# Patient Record
Sex: FEMALE | Race: ASIAN | NOT HISPANIC OR LATINO | URBAN - METROPOLITAN AREA
[De-identification: names, ages, dates, MRNs, and addresses within clinical notes are randomized per-mention and may not be internally consistent; named-entity substitution may affect disease eponyms.]

---

## 2024-09-12 ENCOUNTER — EMERGENCY (EMERGENCY)
Facility: HOSPITAL | Age: 24
LOS: 1 days | Discharge: HOME | End: 2024-09-12
Attending: EMERGENCY MEDICINE | Admitting: EMERGENCY MEDICINE
Payer: MEDICAID

## 2024-09-12 VITALS
RESPIRATION RATE: 16 BRPM | SYSTOLIC BLOOD PRESSURE: 113 MMHG | OXYGEN SATURATION: 99 % | HEART RATE: 89 BPM | HEIGHT: 66 IN | WEIGHT: 149.91 LBS | DIASTOLIC BLOOD PRESSURE: 80 MMHG | TEMPERATURE: 98 F

## 2024-09-12 VITALS
RESPIRATION RATE: 16 BRPM | DIASTOLIC BLOOD PRESSURE: 76 MMHG | HEART RATE: 69 BPM | TEMPERATURE: 98 F | OXYGEN SATURATION: 100 % | SYSTOLIC BLOOD PRESSURE: 108 MMHG

## 2024-09-12 LAB
ADD ON TEST-SPECIMEN IN LAB: SIGNIFICANT CHANGE UP
ALBUMIN SERPL ELPH-MCNC: 4.7 G/DL — SIGNIFICANT CHANGE UP (ref 3.3–5)
ALP SERPL-CCNC: 76 U/L — SIGNIFICANT CHANGE UP (ref 40–120)
ALT FLD-CCNC: 21 U/L — SIGNIFICANT CHANGE UP (ref 4–33)
ANION GAP SERPL CALC-SCNC: 14 MMOL/L — SIGNIFICANT CHANGE UP (ref 7–14)
APPEARANCE UR: CLEAR — SIGNIFICANT CHANGE UP
AST SERPL-CCNC: 18 U/L — SIGNIFICANT CHANGE UP (ref 4–32)
BACTERIA # UR AUTO: ABNORMAL /HPF
BASOPHILS # BLD AUTO: 0.06 K/UL — SIGNIFICANT CHANGE UP (ref 0–0.2)
BASOPHILS NFR BLD AUTO: 0.7 % — SIGNIFICANT CHANGE UP (ref 0–2)
BILIRUB SERPL-MCNC: 0.3 MG/DL — SIGNIFICANT CHANGE UP (ref 0.2–1.2)
BILIRUB UR-MCNC: NEGATIVE — SIGNIFICANT CHANGE UP
BUN SERPL-MCNC: 9 MG/DL — SIGNIFICANT CHANGE UP (ref 7–23)
CALCIUM SERPL-MCNC: 10 MG/DL — SIGNIFICANT CHANGE UP (ref 8.4–10.5)
CAST: 2 /LPF — SIGNIFICANT CHANGE UP (ref 0–4)
CHLORIDE SERPL-SCNC: 102 MMOL/L — SIGNIFICANT CHANGE UP (ref 98–107)
CO2 SERPL-SCNC: 22 MMOL/L — SIGNIFICANT CHANGE UP (ref 22–31)
COLOR SPEC: YELLOW — SIGNIFICANT CHANGE UP
CREAT SERPL-MCNC: 0.54 MG/DL — SIGNIFICANT CHANGE UP (ref 0.5–1.3)
DIFF PNL FLD: NEGATIVE — SIGNIFICANT CHANGE UP
EGFR: 133 ML/MIN/1.73M2 — SIGNIFICANT CHANGE UP
EGFR: 133 ML/MIN/1.73M2 — SIGNIFICANT CHANGE UP
EOSINOPHIL # BLD AUTO: 0.1 K/UL — SIGNIFICANT CHANGE UP (ref 0–0.5)
EOSINOPHIL NFR BLD AUTO: 1.2 % — SIGNIFICANT CHANGE UP (ref 0–6)
GLUCOSE SERPL-MCNC: 74 MG/DL — SIGNIFICANT CHANGE UP (ref 70–99)
GLUCOSE UR QL: NEGATIVE MG/DL — SIGNIFICANT CHANGE UP
HCT VFR BLD CALC: 44.1 % — SIGNIFICANT CHANGE UP (ref 34.5–45)
HGB BLD-MCNC: 14.1 G/DL — SIGNIFICANT CHANGE UP (ref 11.5–15.5)
HIV 1+2 AB+HIV1 P24 AG SERPL QL IA: SIGNIFICANT CHANGE UP
IANC: 4.12 K/UL — SIGNIFICANT CHANGE UP (ref 1.8–7.4)
IMM GRANULOCYTES NFR BLD AUTO: 0.2 % — SIGNIFICANT CHANGE UP (ref 0–0.9)
KETONES UR-MCNC: NEGATIVE MG/DL — SIGNIFICANT CHANGE UP
LEUKOCYTE ESTERASE UR-ACNC: ABNORMAL
LIDOCAIN IGE QN: 44 U/L — SIGNIFICANT CHANGE UP (ref 7–60)
LYMPHOCYTES # BLD AUTO: 3.29 K/UL — SIGNIFICANT CHANGE UP (ref 1–3.3)
LYMPHOCYTES # BLD AUTO: 40.6 % — SIGNIFICANT CHANGE UP (ref 13–44)
MCHC RBC-ENTMCNC: 27.4 PG — SIGNIFICANT CHANGE UP (ref 27–34)
MCHC RBC-ENTMCNC: 32 GM/DL — SIGNIFICANT CHANGE UP (ref 32–36)
MCV RBC AUTO: 85.6 FL — SIGNIFICANT CHANGE UP (ref 80–100)
MONOCYTES # BLD AUTO: 0.51 K/UL — SIGNIFICANT CHANGE UP (ref 0–0.9)
MONOCYTES NFR BLD AUTO: 6.3 % — SIGNIFICANT CHANGE UP (ref 2–14)
NEUTROPHILS # BLD AUTO: 4.12 K/UL — SIGNIFICANT CHANGE UP (ref 1.8–7.4)
NEUTROPHILS NFR BLD AUTO: 51 % — SIGNIFICANT CHANGE UP (ref 43–77)
NITRITE UR-MCNC: NEGATIVE — SIGNIFICANT CHANGE UP
NRBC # BLD AUTO: 0 K/UL — SIGNIFICANT CHANGE UP (ref 0–0)
NRBC # BLD: 0 /100 WBCS — SIGNIFICANT CHANGE UP (ref 0–0)
NRBC # FLD: 0 K/UL — SIGNIFICANT CHANGE UP (ref 0–0)
NRBC BLD-RTO: 0 /100 WBCS — SIGNIFICANT CHANGE UP (ref 0–0)
PH UR: 7.5 — SIGNIFICANT CHANGE UP (ref 5–8)
PLATELET # BLD AUTO: 249 K/UL — SIGNIFICANT CHANGE UP (ref 150–400)
POTASSIUM SERPL-MCNC: 4.4 MMOL/L — SIGNIFICANT CHANGE UP (ref 3.5–5.3)
POTASSIUM SERPL-SCNC: 4.4 MMOL/L — SIGNIFICANT CHANGE UP (ref 3.5–5.3)
PROT SERPL-MCNC: 8.5 G/DL — HIGH (ref 6–8.3)
PROT UR-MCNC: NEGATIVE MG/DL — SIGNIFICANT CHANGE UP
RBC # BLD: 5.15 M/UL — SIGNIFICANT CHANGE UP (ref 3.8–5.2)
RBC # FLD: 13 % — SIGNIFICANT CHANGE UP (ref 10.3–14.5)
RBC CASTS # UR COMP ASSIST: 0 /HPF — SIGNIFICANT CHANGE UP (ref 0–4)
REVIEW: SIGNIFICANT CHANGE UP
SODIUM SERPL-SCNC: 138 MMOL/L — SIGNIFICANT CHANGE UP (ref 135–145)
SP GR SPEC: 1.01 — SIGNIFICANT CHANGE UP (ref 1–1.03)
SQUAMOUS # UR AUTO: 9 /HPF — HIGH (ref 0–5)
UROBILINOGEN FLD QL: 0.2 MG/DL — SIGNIFICANT CHANGE UP (ref 0.2–1)
WBC # BLD: 8.1 K/UL — SIGNIFICANT CHANGE UP (ref 3.8–10.5)
WBC # FLD AUTO: 8.1 K/UL — SIGNIFICANT CHANGE UP (ref 3.8–10.5)
WBC UR QL: 1 /HPF — SIGNIFICANT CHANGE UP (ref 0–5)

## 2024-09-12 PROCEDURE — 76705 ECHO EXAM OF ABDOMEN: CPT | Mod: 26

## 2024-09-12 PROCEDURE — 74177 CT ABD & PELVIS W/CONTRAST: CPT | Mod: 26,MC

## 2024-09-12 PROCEDURE — 99285 EMERGENCY DEPT VISIT HI MDM: CPT

## 2024-09-12 PROCEDURE — 76857 US EXAM PELVIC LIMITED: CPT | Mod: 26

## 2024-09-12 PROCEDURE — 76830 TRANSVAGINAL US NON-OB: CPT | Mod: 26

## 2024-09-12 RX ORDER — ACETAMINOPHEN 500 MG/5ML
1000 LIQUID (ML) ORAL ONCE
Refills: 0 | Status: COMPLETED | OUTPATIENT
Start: 2024-09-12 | End: 2024-09-12

## 2024-09-12 RX ADMIN — Medication 1000 MILLILITER(S): at 12:33

## 2024-09-12 RX ADMIN — Medication 400 MILLIGRAM(S): at 12:32

## 2024-09-13 PROBLEM — Z78.9 OTHER SPECIFIED HEALTH STATUS: Chronic | Status: ACTIVE | Noted: 2024-09-12

## 2024-09-13 PROBLEM — Z00.00 ENCOUNTER FOR PREVENTIVE HEALTH EXAMINATION: Status: ACTIVE | Noted: 2024-09-13

## 2024-09-13 LAB
C TRACH RRNA SPEC QL NAA+PROBE: SIGNIFICANT CHANGE UP
N GONORRHOEA RRNA SPEC QL NAA+PROBE: SIGNIFICANT CHANGE UP
SPECIMEN SOURCE: SIGNIFICANT CHANGE UP
T PALLIDUM AB TITR SER: NEGATIVE — SIGNIFICANT CHANGE UP

## 2024-09-15 ENCOUNTER — TRANSCRIPTION ENCOUNTER (OUTPATIENT)
Age: 24
End: 2024-09-15

## 2024-09-16 ENCOUNTER — INPATIENT (INPATIENT)
Facility: HOSPITAL | Age: 24
LOS: 0 days | Discharge: HOME | End: 2024-09-17
Attending: OBSTETRICS & GYNECOLOGY | Admitting: OBSTETRICS & GYNECOLOGY
Payer: MEDICAID

## 2024-09-16 ENCOUNTER — TRANSCRIPTION ENCOUNTER (OUTPATIENT)
Age: 24
End: 2024-09-16

## 2024-09-16 VITALS
DIASTOLIC BLOOD PRESSURE: 87 MMHG | HEART RATE: 77 BPM | WEIGHT: 149.91 LBS | OXYGEN SATURATION: 99 % | TEMPERATURE: 98 F | HEIGHT: 66 IN | RESPIRATION RATE: 16 BRPM | SYSTOLIC BLOOD PRESSURE: 122 MMHG

## 2024-09-16 DIAGNOSIS — R10.9 UNSPECIFIED ABDOMINAL PAIN: ICD-10-CM

## 2024-09-16 LAB
-  AMOXICILLIN/CLAVULANIC ACID: SIGNIFICANT CHANGE UP
-  AMPICILLIN/SULBACTAM: SIGNIFICANT CHANGE UP
-  AMPICILLIN: SIGNIFICANT CHANGE UP
-  AZTREONAM: SIGNIFICANT CHANGE UP
-  CEFAZOLIN: SIGNIFICANT CHANGE UP
-  CEFEPIME: SIGNIFICANT CHANGE UP
-  CEFOXITIN: SIGNIFICANT CHANGE UP
-  CEFTRIAXONE: SIGNIFICANT CHANGE UP
-  CEFUROXIME: SIGNIFICANT CHANGE UP
-  CIPROFLOXACIN: SIGNIFICANT CHANGE UP
-  ERTAPENEM: SIGNIFICANT CHANGE UP
-  GENTAMICIN: SIGNIFICANT CHANGE UP
-  IMIPENEM: SIGNIFICANT CHANGE UP
-  LEVOFLOXACIN: SIGNIFICANT CHANGE UP
-  MEROPENEM: SIGNIFICANT CHANGE UP
-  NITROFURANTOIN: SIGNIFICANT CHANGE UP
-  PIPERACILLIN/TAZOBACTAM: SIGNIFICANT CHANGE UP
-  TOBRAMYCIN: SIGNIFICANT CHANGE UP
-  TRIMETHOPRIM/SULFAMETHOXAZOLE: SIGNIFICANT CHANGE UP
ALBUMIN SERPL ELPH-MCNC: 4.1 G/DL — SIGNIFICANT CHANGE UP (ref 3.3–5)
ALP SERPL-CCNC: 59 U/L — SIGNIFICANT CHANGE UP (ref 40–120)
ALT FLD-CCNC: 20 U/L — SIGNIFICANT CHANGE UP (ref 4–33)
ANION GAP SERPL CALC-SCNC: 10 MMOL/L — SIGNIFICANT CHANGE UP (ref 7–14)
APPEARANCE UR: ABNORMAL
AST SERPL-CCNC: 16 U/L — SIGNIFICANT CHANGE UP (ref 4–32)
BACTERIA # UR AUTO: ABNORMAL /HPF
BASOPHILS # BLD AUTO: 0.05 K/UL — SIGNIFICANT CHANGE UP (ref 0–0.2)
BASOPHILS NFR BLD AUTO: 0.5 % — SIGNIFICANT CHANGE UP (ref 0–2)
BILIRUB SERPL-MCNC: 0.3 MG/DL — SIGNIFICANT CHANGE UP (ref 0.2–1.2)
BILIRUB UR-MCNC: NEGATIVE — SIGNIFICANT CHANGE UP
BLD GP AB SCN SERPL QL: NEGATIVE — SIGNIFICANT CHANGE UP
BUN SERPL-MCNC: 14 MG/DL — SIGNIFICANT CHANGE UP (ref 7–23)
CALCIUM SERPL-MCNC: 9.4 MG/DL — SIGNIFICANT CHANGE UP (ref 8.4–10.5)
CAST: 1 /LPF — SIGNIFICANT CHANGE UP (ref 0–4)
CHLORIDE SERPL-SCNC: 102 MMOL/L — SIGNIFICANT CHANGE UP (ref 98–107)
CO2 SERPL-SCNC: 23 MMOL/L — SIGNIFICANT CHANGE UP (ref 22–31)
COLOR SPEC: YELLOW — SIGNIFICANT CHANGE UP
CREAT SERPL-MCNC: 0.55 MG/DL — SIGNIFICANT CHANGE UP (ref 0.5–1.3)
CULTURE RESULTS: ABNORMAL
DIFF PNL FLD: NEGATIVE — SIGNIFICANT CHANGE UP
EGFR: 132 ML/MIN/1.73M2 — SIGNIFICANT CHANGE UP
EOSINOPHIL # BLD AUTO: 0.05 K/UL — SIGNIFICANT CHANGE UP (ref 0–0.5)
EOSINOPHIL NFR BLD AUTO: 0.5 % — SIGNIFICANT CHANGE UP (ref 0–6)
GLUCOSE SERPL-MCNC: 113 MG/DL — HIGH (ref 70–99)
GLUCOSE UR QL: NEGATIVE MG/DL — SIGNIFICANT CHANGE UP
HCT VFR BLD CALC: 41.8 % — SIGNIFICANT CHANGE UP (ref 34.5–45)
HGB BLD-MCNC: 13.4 G/DL — SIGNIFICANT CHANGE UP (ref 11.5–15.5)
IANC: 6.66 K/UL — SIGNIFICANT CHANGE UP (ref 1.8–7.4)
IMM GRANULOCYTES NFR BLD AUTO: 0.2 % — SIGNIFICANT CHANGE UP (ref 0–0.9)
KETONES UR-MCNC: ABNORMAL MG/DL
LEUKOCYTE ESTERASE UR-ACNC: ABNORMAL
LYMPHOCYTES # BLD AUTO: 2.58 K/UL — SIGNIFICANT CHANGE UP (ref 1–3.3)
LYMPHOCYTES # BLD AUTO: 26.4 % — SIGNIFICANT CHANGE UP (ref 13–44)
MCHC RBC-ENTMCNC: 27.2 PG — SIGNIFICANT CHANGE UP (ref 27–34)
MCHC RBC-ENTMCNC: 32.1 GM/DL — SIGNIFICANT CHANGE UP (ref 32–36)
MCV RBC AUTO: 85 FL — SIGNIFICANT CHANGE UP (ref 80–100)
METHOD TYPE: SIGNIFICANT CHANGE UP
MONOCYTES # BLD AUTO: 0.41 K/UL — SIGNIFICANT CHANGE UP (ref 0–0.9)
MONOCYTES NFR BLD AUTO: 4.2 % — SIGNIFICANT CHANGE UP (ref 2–14)
NEUTROPHILS # BLD AUTO: 6.66 K/UL — SIGNIFICANT CHANGE UP (ref 1.8–7.4)
NEUTROPHILS NFR BLD AUTO: 68.2 % — SIGNIFICANT CHANGE UP (ref 43–77)
NITRITE UR-MCNC: POSITIVE
NRBC # BLD: 0 /100 WBCS — SIGNIFICANT CHANGE UP (ref 0–0)
NRBC # FLD: 0 K/UL — SIGNIFICANT CHANGE UP (ref 0–0)
ORGANISM # SPEC MICROSCOPIC CNT: ABNORMAL
ORGANISM # SPEC MICROSCOPIC CNT: ABNORMAL
PH UR: 5.5 — SIGNIFICANT CHANGE UP (ref 5–8)
PLATELET # BLD AUTO: 222 K/UL — SIGNIFICANT CHANGE UP (ref 150–400)
POTASSIUM SERPL-MCNC: 4.2 MMOL/L — SIGNIFICANT CHANGE UP (ref 3.5–5.3)
POTASSIUM SERPL-SCNC: 4.2 MMOL/L — SIGNIFICANT CHANGE UP (ref 3.5–5.3)
PROT SERPL-MCNC: 7.5 G/DL — SIGNIFICANT CHANGE UP (ref 6–8.3)
PROT UR-MCNC: SIGNIFICANT CHANGE UP MG/DL
RBC # BLD: 4.92 M/UL — SIGNIFICANT CHANGE UP (ref 3.8–5.2)
RBC # FLD: 12.8 % — SIGNIFICANT CHANGE UP (ref 10.3–14.5)
RBC CASTS # UR COMP ASSIST: 2 /HPF — SIGNIFICANT CHANGE UP (ref 0–4)
RH IG SCN BLD-IMP: POSITIVE — SIGNIFICANT CHANGE UP
SODIUM SERPL-SCNC: 135 MMOL/L — SIGNIFICANT CHANGE UP (ref 135–145)
SP GR SPEC: 1.03 — HIGH (ref 1–1.03)
SPECIMEN SOURCE: SIGNIFICANT CHANGE UP
SQUAMOUS # UR AUTO: 20 /HPF — HIGH (ref 0–5)
UROBILINOGEN FLD QL: 1 MG/DL — SIGNIFICANT CHANGE UP (ref 0.2–1)
WBC # BLD: 9.77 K/UL — SIGNIFICANT CHANGE UP (ref 3.8–10.5)
WBC # FLD AUTO: 9.77 K/UL — SIGNIFICANT CHANGE UP (ref 3.8–10.5)
WBC UR QL: 5 /HPF — SIGNIFICANT CHANGE UP (ref 0–5)

## 2024-09-16 PROCEDURE — 88341 IMHCHEM/IMCYTCHM EA ADD ANTB: CPT | Mod: 26

## 2024-09-16 PROCEDURE — 76830 TRANSVAGINAL US NON-OB: CPT | Mod: 26

## 2024-09-16 PROCEDURE — 58925 REMOVAL OF OVARIAN CYST(S): CPT

## 2024-09-16 PROCEDURE — 99285 EMERGENCY DEPT VISIT HI MDM: CPT

## 2024-09-16 PROCEDURE — 88305 TISSUE EXAM BY PATHOLOGIST: CPT | Mod: 26

## 2024-09-16 PROCEDURE — 88342 IMHCHEM/IMCYTCHM 1ST ANTB: CPT | Mod: 26

## 2024-09-16 DEVICE — ARISTA 3GR: Type: IMPLANTABLE DEVICE | Status: FUNCTIONAL

## 2024-09-16 RX ORDER — HYDROMORPHONE HYDROCHLORIDE 2 MG/1
0.5 TABLET ORAL
Refills: 0 | Status: DISCONTINUED | OUTPATIENT
Start: 2024-09-17 | End: 2024-09-17

## 2024-09-16 RX ORDER — METOCLOPRAMIDE HCL 5 MG
10 TABLET ORAL ONCE
Refills: 0 | Status: DISCONTINUED | OUTPATIENT
Start: 2024-09-17 | End: 2024-09-17

## 2024-09-16 RX ORDER — ACETAMINOPHEN 325 MG/1
1000 TABLET ORAL ONCE
Refills: 0 | Status: COMPLETED | OUTPATIENT
Start: 2024-09-16 | End: 2024-09-16

## 2024-09-16 RX ORDER — KETOROLAC TROMETHAMINE 30 MG/ML
15 INJECTION, SOLUTION INTRAMUSCULAR EVERY 6 HOURS
Refills: 0 | Status: DISCONTINUED | OUTPATIENT
Start: 2024-09-16 | End: 2024-09-17

## 2024-09-16 RX ORDER — ACETAMINOPHEN 325 MG/1
1000 TABLET ORAL EVERY 8 HOURS
Refills: 0 | Status: DISCONTINUED | OUTPATIENT
Start: 2024-09-16 | End: 2024-09-17

## 2024-09-16 RX ORDER — ONDANSETRON 2 MG/ML
4 INJECTION, SOLUTION INTRAMUSCULAR; INTRAVENOUS ONCE
Refills: 0 | Status: DISCONTINUED | OUTPATIENT
Start: 2024-09-17 | End: 2024-09-17

## 2024-09-16 RX ORDER — SODIUM CHLORIDE 9 MG/ML
1000 INJECTION INTRAMUSCULAR; INTRAVENOUS; SUBCUTANEOUS ONCE
Refills: 0 | Status: COMPLETED | OUTPATIENT
Start: 2024-09-16 | End: 2024-09-16

## 2024-09-16 RX ORDER — KETOROLAC TROMETHAMINE 30 MG/ML
15 INJECTION, SOLUTION INTRAMUSCULAR ONCE
Refills: 0 | Status: DISCONTINUED | OUTPATIENT
Start: 2024-09-16 | End: 2024-09-16

## 2024-09-16 RX ADMIN — KETOROLAC TROMETHAMINE 15 MILLIGRAM(S): 30 INJECTION, SOLUTION INTRAMUSCULAR at 10:39

## 2024-09-16 RX ADMIN — ACETAMINOPHEN 400 MILLIGRAM(S): 325 TABLET ORAL at 14:24

## 2024-09-16 RX ADMIN — Medication 125 MILLILITER(S): at 20:13

## 2024-09-16 RX ADMIN — Medication 100 MILLIGRAM(S): at 10:43

## 2024-09-16 RX ADMIN — ACETAMINOPHEN 1000 MILLIGRAM(S): 325 TABLET ORAL at 15:10

## 2024-09-16 RX ADMIN — KETOROLAC TROMETHAMINE 15 MILLIGRAM(S): 30 INJECTION, SOLUTION INTRAMUSCULAR at 14:17

## 2024-09-16 RX ADMIN — SODIUM CHLORIDE 1000 MILLILITER(S): 9 INJECTION INTRAMUSCULAR; INTRAVENOUS; SUBCUTANEOUS at 10:34

## 2024-09-16 NOTE — ED ADULT TRIAGE NOTE - CHIEF COMPLAINT QUOTE
Pt presents for abdominal pain to Rt side of abdomen since this past Thursday accompanied with nausea/vomiting. Pt was treated and evaluated last Thursday for same symptoms, instructed to follow up with gastroenterologist, pt has appointment tomorrow but pain is constant.

## 2024-09-16 NOTE — ED PROVIDER NOTE - CLINICAL SUMMARY MEDICAL DECISION MAKING FREE TEXT BOX
Otherwise healthy female presents with continued right-sided abdominal pain and dysuria.  Patient was seen in this ED on September 12, had CT scan of the abdomen pelvis as well as transvaginal ultrasound showing 2 large structures concerning for endometriomas near the right ovary.  She also had urine culture growing E. coli and continues to have dysuria, was not previously put on antibiotics.  Denies back pain, fevers.  Has only been thing Tylenol for pain but has not had much relief.  She has a scheduled appointment tomorrow for gynecology for further evaluation of likely endometriomas. Plan for pain control, antibiotics, repeat transvaginal ultrasound without torsion, Though low suspicion given no significant increase in pain, has been consistent since prior ED evaluation and no significant tenderness on my exam.

## 2024-09-16 NOTE — ED ADULT NURSE REASSESSMENT NOTE - NS ED NURSE REASSESS COMMENT FT1
A&Ox4, respirations are even and unlabored, no complaints at this time. Report given to OR RN (Pete) by day shift. Pt belongings with mother.

## 2024-09-16 NOTE — ED PROVIDER NOTE - PHYSICAL EXAMINATION
GEN - NAD; well appearing; A+O x3   HEAD - NC/AT   ENT: Airway patent, mmm  NECK: Neck supple  PULMONARY -no resp distress  ABDOMEN - +BS, ND, TTP R side of abd, soft, no guarding, no rebound, no masses   BACK - no CVA tenderness  EXTREMITIES - FROM  NEUROLOGIC - alert, speech clear  PSYCH -nl mood/affect, nl insight.

## 2024-09-16 NOTE — H&P ADULT - ASSESSMENT
A/P:    **incomplete**      L Panella PGY2 A/P: Patient is a 23yo G0 LMP 8/26 with known hx ovarian cysts presenting to the ED with RLQ pain. TVUS with 9cm and 5cm cysts, likely hemorrhagic vs endometrioma. Blood flow demonstrated on ultrasound. Differential for pain includes pain from large space occupying ovarian cyst vs. possible ovarian cyst leakage (possible given small amount of complex free fluid seen on TVUS) vs. intermittent ovarian torsion. Patient currently with overall benign abdomen. However given history of previously severe pain along with nausea and vomiting and large size of cyst on imaging it is difficult to rule out intermittant torsion in this clinical picture. Will admit to GYN for laparoscopic cystectomy in setting of suspected intermittent ovarian torsion. Patient expressed understanding. All questions/concerns addressed. Patient last ate at 1PM, so will plan for surgery after patient NPO@9PM.     Neuro: IV Tylenol and Toradol for pain control  CV: Hemodynamically stable  Pulm: Saturating well on RA  GI: NPO  - will meet NPO status at 9PM  : Voiding spontaneously  Heme: Continue ambulation for DVT ppx. Increase OOB.    ID: UTI  - Pt has known E. Coli UTI found on Cx from 9/12  - s/p CTX x1 in ED  - Will send patient home with PO abx  Endo: No acute issues  Dispo: continue inpatient care until able to go back to operating room. Anticipate d/c from PACU postoperatively    SATHYA Schmidt PGY2  patient seen and examined with Dr. Garcia

## 2024-09-16 NOTE — H&P ADULT - ATTENDING COMMENTS
enlarging  hemorhagic ovarian cyst with recurrent pain  Pt ate rice at 1pm. Currently w/o acute abdomen. Pt to have diagnostic laparoscopy,ov cystectomy with removal/repair diseased /dammaged tissue,poss laparotomy. Surgeon will rev R/B/A,side effects ,compls proposed procedure.Pelvic EUA will be done with learners as well  Pt mom present as well  Ample time for questions provided

## 2024-09-16 NOTE — ED PROVIDER NOTE - PROGRESS NOTE DETAILS
Patient reassessed and still reporting pain.  Offered stronger analgesia such as morphine and patient declined.

## 2024-09-16 NOTE — H&P ADULT - NSHPPHYSICALEXAM_GEN_ALL_CORE
Physical Exam (Chaperoned by ED RN):     General: sitting comfortably in bed, NAD   Lungs: unlabored breathing  Back: Mild R sided CVA tenderness  Abd: Soft, non-tender, non-distended. Moderate tenderness to palpation over the bladder and in the RLQ, no rebound or guarding.   : No bleeding on pad. External labia wnl. Bimanual exam with no cervical motion tenderness, uterus wnl, mild right adnexal tenderness and fullness. Cervix closed  Ext: non-tender b/l, no edema       T(C): 36.4 (09-16-24 @ 09:00), Max: 36.4 (09-16-24 @ 09:00)  HR: 77 (09-16-24 @ 09:00) (77 - 77)  BP: 122/87 (09-16-24 @ 09:00) (122/87 - 122/87)  RR: 16 (09-16-24 @ 09:00) (16 - 16)  SpO2: 99% (09-16-24 @ 09:00) (99% - 99%)

## 2024-09-16 NOTE — ED ADULT NURSE NOTE - OBJECTIVE STATEMENT
This is a 24 y/0 female alert and oriented x 4, with a recent diagnosis of cyst between her ovary and uterus area. Complaining of constant pain 6/10. Abdomen is tender on palpation, denies urinary symptoms, chest is clear bilaterally, breathing is even and unlabored. Denies vaginal bleeding  or any discharges. IV initiated on right hand g 20 blood work sent to lab. Waiting for results, due meds given.

## 2024-09-16 NOTE — H&P ADULT - HISTORY OF PRESENT ILLNESS
Gyn H&P  TOSHA CHACON  24y  Female [id number]    HPI:      Name of GYN Physician:     OBHx:    GYNHx: Denies fibroids, cysts, endometriosis, STI's, abnormal pap smears   PMH:  PSH:  Meds:  All:  Soc:    accepts blood      Physical Exam (Chaperoned by ED RN):     General: sitting comfortably in bed, NAD   CV: RRR  Lungs: CTAB  Back: No CVA tenderness  Abd: Soft, non-tender, non-distended. Bowel sounds present.    :  No bleeding on pad. External labia wnl. Bimanual exam with no cervical motion tenderness, uterus wnl, adnexa non palpable b/l. Cervix closed vs. Cervix dilated cm   Speculum Exam: No active bleeding from os.  Physiologic discharge.    Ext: non-tender b/l, no edema       T(C): 36.4 (09-16-24 @ 09:00), Max: 36.4 (09-16-24 @ 09:00)  HR: 77 (09-16-24 @ 09:00) (77 - 77)  BP: 122/87 (09-16-24 @ 09:00) (122/87 - 122/87)  RR: 16 (09-16-24 @ 09:00) (16 - 16)  SpO2: 99% (09-16-24 @ 09:00) (99% - 99%)      LABS:                              13.4   9.77  )-----------( 222      ( 16 Sep 2024 11:16 )             41.8     09-16    135  |  102  |  14  ----------------------------<  113<H>  4.2   |  23  |  0.55    Ca    9.4      16 Sep 2024 11:16    TPro  7.5  /  Alb  4.1  /  TBili  0.3  /  DBili  x   /  AST  16  /  ALT  20  /  AlkPhos  59  09-16    I&O's Detail      Urinalysis Basic - ( 16 Sep 2024 11:16 )    Color: x / Appearance: x / SG: x / pH: x  Gluc: 113 mg/dL / Ketone: x  / Bili: x / Urobili: x   Blood: x / Protein: x / Nitrite: x   Leuk Esterase: x / RBC: x / WBC x   Sq Epi: x / Non Sq Epi: x / Bacteria: x          RADIOLOGY & ADDITIONAL STUDIES:    A/P:    L Panella PGY2 Gyn H&P  TOSHA INES  24y  Female [id number]    HPI: Patient is a 23yo G0 LMP 8/26 with known hx ovarian cysts presenting to the ED with RLQ pain.    The patient was originally seen in the ED on 9/12, at which time she was having RLQ pain and dysuria. Urine culture from that visit came back 50-99k E. Coli, however she was not treated with antibiotics. The patient reports that since that time, her pain has become progressively worse. The pain is mostly in her RLQ and radiates into her lower back. It is cramping, but now more sharp and at its worst is a 10/10. After Toradol in the ED she reports it's an 8/10. The pain was bad enough that she vomited this morning. She said she feels like she has a mild fever and still has dysuria and pain over her bladder. She otherwise denies any CP, SOB, upper abdominal pain, vaginal bleeding or discharge, constipation, diarrhea. She had plans to see a new GYN in the office tomorrow.      Name of GYN Physician: none    OBHx:    GYNHx: denies hx STI's, abnormal pap smears. +small fibroids, + ovarian cysts, +possible endometriosis (cysts appear like endometriomas). Has regular monthly menses, painful  PMH: denies  PSH: denies  Meds: Tylenol PRN, Motrin PRN  All: NKDA  Soc: denies T/A/D    accepts blood

## 2024-09-16 NOTE — H&P ADULT - NSHPLABSRESULTS_GEN_ALL_CORE
LABS:                        13.4   9.77  )-----------( 222      ( 16 Sep 2024 11:16 )             41.8     09-16    135  |  102  |  14  ----------------------------<  113<H>  4.2   |  23  |  0.55    Ca    9.4      16 Sep 2024 11:16    TPro  7.5  /  Alb  4.1  /  TBili  0.3  /  DBili  x   /  AST  16  /  ALT  20  /  AlkPhos  59  09-16    I&O's Detail      Urinalysis Basic - ( 16 Sep 2024 11:16 )    Color: x / Appearance: x / SG: x / pH: x  Gluc: 113 mg/dL / Ketone: x  / Bili: x / Urobili: x   Blood: x / Protein: x / Nitrite: x   Leuk Esterase: x / RBC: x / WBC x   Sq Epi: x / Non Sq Epi: x / Bacteria: x    Culture - Urine (09.12.24 @ 15:02)   - Amoxicillin/Clavulanic Acid: S <=8/4 Consider reserving for cystitis when ampicillin/sulbactam is resistant  - Ampicillin: R >16 These ampicillin results predict results for amoxicillin  - Ampicillin/Sulbactam: R >16/8  - Aztreonam: S <=4  - Cefazolin: S 4 For uncomplicated UTI with K. pneumoniae, E. coli, or P. mirablis: DEVAN <=16 is sensitive and DEVAN >=32 is resistant. This also predicts results for oral agents cefaclor, cefdinir, cefpodoxime, cefprozil, cefuroxime axetil, cephalexin and locarbef for uncomplicated UTI. Note that some isolates may be susceptible to these agents while testing resistant to cefazolin.  - Cefepime: S <=2  - Cefoxitin: S <=8  - Ceftriaxone: S <=1  - Cefuroxime: S <=4  - Ciprofloxacin: S <=0.25  - Ertapenem: S <=0.5  - Gentamicin: S <=2  - Imipenem: S <=1  - Levofloxacin: S <=0.5  - Meropenem: S <=1  - Nitrofurantoin: S <=32 Should not be used to treat pyelonephritis  - Piperacillin/Tazobactam: S <=8  - Tobramycin: S <=2  - Trimethoprim/Sulfamethoxazole: S <=0.5/9.5  Specimen Source: Clean Catch Clean Catch (Midstream)  Culture Results:   50,000 - 99,000 CFU/mL Escherichia coli  Organism Identification: Escherichia coli  Organism: Escherichia coli  Method Type: DEVAN      RADIOLOGY & ADDITIONAL STUDIES:  < from: US Transvaginal (09.16.24 @ 11:48) >    PROCEDURE DATE:  09/16/2024          INTERPRETATION:  CLINICAL INFORMATION: Pelvic pain. Evaluate for torsion.    LMP: N/A    COMPARISON: None available.    TECHNIQUE:  Endovaginal pelvic sonogram only.    FINDINGS:  Uterus: 7.7 cm x 4.0 cm x 5.3 cm. Within normal limits.  Endometrium: 9 mm. Within normal limits.    Right ovary: Cystic structure in the right adnexa with low-level echoes   demonstrating incomplete septations measures 9.3 x 6.5 x 7.4 cm, possibly   hematosalpinx or endometrioma. Right adnexal cyst measures 3.8 x 2.6 x   5.0 cm. Normal arterial and venous waveforms.  Left ovary: 3.6 cm x 2.5 cm x 2.0 cm. Within normal limits.    Fluid: Small amount of complex free fluid, likely hemorrhagic.    IMPRESSION:  *  Cystic structure in the right adnexa with low level echoes and   incomplete septations measuring up to 9.3 cm, possibly reflecting   endometrioma or hematosalpinx.  *  Right adnexal simple appearing cyst measuring up to 5.0 cm.  *  Small amount of complex free fluid in the pelvis, likely hemorrhagic.  *  No evidence of ovarian torsion, as clinically questioned.

## 2024-09-17 VITALS
RESPIRATION RATE: 16 BRPM | OXYGEN SATURATION: 100 % | HEART RATE: 95 BPM | SYSTOLIC BLOOD PRESSURE: 112 MMHG | DIASTOLIC BLOOD PRESSURE: 79 MMHG

## 2024-09-17 LAB
HCT VFR BLD CALC: 37.8 % — SIGNIFICANT CHANGE UP (ref 34.5–45)
HGB BLD-MCNC: 12.4 G/DL — SIGNIFICANT CHANGE UP (ref 11.5–15.5)
MCHC RBC-ENTMCNC: 27.6 PG — SIGNIFICANT CHANGE UP (ref 27–34)
MCHC RBC-ENTMCNC: 32.8 GM/DL — SIGNIFICANT CHANGE UP (ref 32–36)
MCV RBC AUTO: 84.2 FL — SIGNIFICANT CHANGE UP (ref 80–100)
NRBC # BLD: 0 /100 WBCS — SIGNIFICANT CHANGE UP (ref 0–0)
NRBC # FLD: 0 K/UL — SIGNIFICANT CHANGE UP (ref 0–0)
PLATELET # BLD AUTO: 217 K/UL — SIGNIFICANT CHANGE UP (ref 150–400)
RBC # BLD: 4.49 M/UL — SIGNIFICANT CHANGE UP (ref 3.8–5.2)
RBC # FLD: 12.7 % — SIGNIFICANT CHANGE UP (ref 10.3–14.5)
WBC # BLD: 15.25 K/UL — HIGH (ref 3.8–10.5)
WBC # FLD AUTO: 15.25 K/UL — HIGH (ref 3.8–10.5)

## 2024-09-17 RX ORDER — OXYCODONE HYDROCHLORIDE 5 MG/1
1 TABLET ORAL
Qty: 8 | Refills: 0
Start: 2024-09-17

## 2024-09-17 RX ORDER — CEFDINIR 300 MG/1
1 CAPSULE ORAL
Qty: 14 | Refills: 0
Start: 2024-09-17 | End: 2024-09-23

## 2024-09-17 RX ADMIN — Medication 125 MILLILITER(S): at 04:23

## 2024-09-17 RX ADMIN — HYDROMORPHONE HYDROCHLORIDE 0.5 MILLIGRAM(S): 2 TABLET ORAL at 02:40

## 2024-09-17 RX ADMIN — HYDROMORPHONE HYDROCHLORIDE 0.5 MILLIGRAM(S): 2 TABLET ORAL at 02:25

## 2024-09-17 RX ADMIN — HYDROMORPHONE HYDROCHLORIDE 0.5 MILLIGRAM(S): 2 TABLET ORAL at 04:23

## 2024-09-17 NOTE — ASU DISCHARGE PLAN (ADULT/PEDIATRIC) - DIET/FLUID RESTRICTION
IVY - OAK - SUMAC-ADULT-
Pt wants to be seen tomorrow for poison ivy rash starting to involve the face/cheek area  Appointment scheduled for tomorrow  
Regarding: sick ryan poison ivy  ----- Message from Lisandro Albrecht sent at 6/15/2022  6:05 PM EDT -----  "I have  poison ivy and I want to make an sick appointment with my doctor I don't want to go to an urgent care"
No change/Progress slowly

## 2024-09-17 NOTE — ASU DISCHARGE PLAN (ADULT/PEDIATRIC) - CARE PROVIDER_API CALL
Dar Gillespie  Obstetrics and Gynecology  [address] [facility], [address] 1 [address]  [place] [address]  Phone: [phone]  Fax: [phone]  Phone: (   )    -  Fax: (   )    -  Follow Up Time: 2 weeks

## 2024-09-17 NOTE — BRIEF OPERATIVE NOTE - NSICDXBRIEFPREOP_GEN_ALL_CORE_FT
PRE-OP DIAGNOSIS:  Right ovarian cyst 17-Sep-2024 01:44:27  Carlee Acuña  Pelvic pain 17-Sep-2024 01:44:34  Carlee Acuña

## 2024-09-17 NOTE — ASU DISCHARGE PLAN (ADULT/PEDIATRIC) - NURSING INSTRUCTIONS
You were given intravenous TYLENOL for pain management at ____1045pm___________. Please DO NOT take any products containing TYLENOL or ACETAMINOPHEN, such as VICODIN, PERCOCET, EXCEDRIN, and any over-the-counter cold medication for the next 6 hours (until ________445am______). DO NOT TAKE MORE THAN 3000 MG OF TYLENOL in a 24 hour period.

## 2024-09-17 NOTE — BRIEF OPERATIVE NOTE - OPERATION/FINDINGS
Normal upper abdominal survey. Uterus normal appearing. Left fallopian tube and ovary normal in appearance. Right fallopian tube normal. Right ovary enlarged to approximately 9cm with 6 simple appearing ovarian cysts ranging in size from 2cm to 5cm. Cysts ruptured intraoperatively and straw colored fluid suctioned. Cyst walls removed and miladis powder placed over ovarian surgical bed.

## 2024-09-17 NOTE — ASU DISCHARGE PLAN (ADULT/PEDIATRIC) - PROVIDER TOKENS
FREE:[LAST:[Verna],FIRST:[Dar],PHONE:[(   )    -],FAX:[(   )    -],ADDRESS:[Obstetrics and Gynecology  [address] [place] [address], [address] 1 [address]  [place] [address]  Phone: [phone]  Fax: [phone]],FOLLOWUP:[2 weeks]]

## 2024-09-19 LAB
-  AMOXICILLIN/CLAVULANIC ACID: SIGNIFICANT CHANGE UP
-  AMPICILLIN/SULBACTAM: SIGNIFICANT CHANGE UP
-  AMPICILLIN: SIGNIFICANT CHANGE UP
-  AZTREONAM: SIGNIFICANT CHANGE UP
-  CEFAZOLIN: SIGNIFICANT CHANGE UP
-  CEFEPIME: SIGNIFICANT CHANGE UP
-  CEFOXITIN: SIGNIFICANT CHANGE UP
-  CEFTRIAXONE: SIGNIFICANT CHANGE UP
-  CEFUROXIME: SIGNIFICANT CHANGE UP
-  CIPROFLOXACIN: SIGNIFICANT CHANGE UP
-  ERTAPENEM: SIGNIFICANT CHANGE UP
-  GENTAMICIN: SIGNIFICANT CHANGE UP
-  IMIPENEM: SIGNIFICANT CHANGE UP
-  LEVOFLOXACIN: SIGNIFICANT CHANGE UP
-  MEROPENEM: SIGNIFICANT CHANGE UP
-  NITROFURANTOIN: SIGNIFICANT CHANGE UP
-  PIPERACILLIN/TAZOBACTAM: SIGNIFICANT CHANGE UP
-  TOBRAMYCIN: SIGNIFICANT CHANGE UP
-  TRIMETHOPRIM/SULFAMETHOXAZOLE: SIGNIFICANT CHANGE UP
CULTURE RESULTS: ABNORMAL
METHOD TYPE: SIGNIFICANT CHANGE UP
ORGANISM # SPEC MICROSCOPIC CNT: ABNORMAL
ORGANISM # SPEC MICROSCOPIC CNT: ABNORMAL
SPECIMEN SOURCE: SIGNIFICANT CHANGE UP

## 2024-10-01 ENCOUNTER — NON-APPOINTMENT (OUTPATIENT)
Age: 24
End: 2024-10-01

## 2024-10-01 ENCOUNTER — APPOINTMENT (OUTPATIENT)
Dept: OBGYN | Facility: CLINIC | Age: 24
End: 2024-10-01

## 2024-10-01 VITALS
DIASTOLIC BLOOD PRESSURE: 70 MMHG | HEIGHT: 66 IN | WEIGHT: 169 LBS | BODY MASS INDEX: 27.16 KG/M2 | SYSTOLIC BLOOD PRESSURE: 110 MMHG

## 2024-10-01 DIAGNOSIS — N83.201 UNSPECIFIED OVARIAN CYST, RIGHT SIDE: ICD-10-CM

## 2024-10-01 DIAGNOSIS — Z98.890 OTHER SPECIFIED POSTPROCEDURAL STATES: ICD-10-CM

## 2024-10-01 LAB
HCG UR QL: NEGATIVE
QUALITY CONTROL: YES

## 2024-10-01 PROCEDURE — 99024 POSTOP FOLLOW-UP VISIT: CPT

## 2024-10-01 PROCEDURE — 81025 URINE PREGNANCY TEST: CPT

## 2024-10-03 NOTE — DISCUSSION/SUMMARY
[FreeTextEntry1] : She did well after the procedure which was uncomplicated. Pathology is still pending and I will call the patient when it is resulted. Clearance letter given to return to work.  We discussed that based on pathology there may be an option to try OCPs to prevent future cyst formation. She does not desire to use birth control pills at this time but will consider her options. I advised repeat sonogram in 3 months to re-eval the right ovary.

## 2024-10-03 NOTE — HISTORY OF PRESENT ILLNESS
[FreeTextEntry1] : Miriam presents for follow up after undergoing emergency surgery for suspected ovarian torsion.  9/16/24 lap ovarian cystectomy which was uncomplicated. Several cysts were removed from the right ovary which was displaced anterior to the uterus and the procedure took about 2 hours. Pathology is still pending.  She completed medication for UTI which was also noted in the ED. She reports she has recovered well and her pain from the cysts was gone quickly after the procedure. She is not taking pain medication. Denies fevers/chills. She is tolerating PO.  [No] : Patient does not have concerns regarding sex [Never active] : never active

## 2024-10-03 NOTE — PHYSICAL EXAM
[Appropriately responsive] : appropriately responsive [Alert] : alert [No Acute Distress] : no acute distress [Soft] : soft [Non-tender] : non-tender [Non-distended] : non-distended [Oriented x3] : oriented x3 [FreeTextEntry7] : lap sites c/d/i and healing well

## 2024-10-09 LAB — SURGICAL PATHOLOGY STUDY: SIGNIFICANT CHANGE UP

## 2024-10-10 NOTE — CHART NOTE - NSCHARTNOTEFT_GEN_A_CORE
Called patient to review the pathology from her surgery on 9/16/2024. Informed patients pathology was benign. Patient endorsed following up with Dr Gillespie for her postoperative check. Patient to be removed from GYN follow up list    discussed w Dr Gulshan Patten PGY4
R2 OBGYN POST-OP CHECK    S: Patient seen and evaluated at bedside. Pt sleeping, easily arousable.  Patient reports pain controlled with analgesia. Pt denies lightheadedness/dizziness, N/V, SOB, CP, palpitations, fever/chills. Not OOB yet.    O:   T(C): 37 (09-17-24 @ 01:00), Max: 37 (09-17-24 @ 01:00)  HR: 87 (09-17-24 @ 03:00) (87 - 113)  BP: 116/74 (09-17-24 @ 03:00) (90/52 - 116/74)  RR: 17 (09-17-24 @ 03:00) (13 - 18)  SpO2: 99% (09-17-24 @ 03:00) (98% - 100%)  Wt(kg): --  I&O's Summary    16 Sep 2024 07:01  -  17 Sep 2024 03:35  --------------------------------------------------------  IN: 250 mL / OUT: 0 mL / NET: 250 mL        Gen: Resting comfortably in bed, NAD  CV: RRR  Lungs: CTA B/L  Abd: soft, appropriately tender, occasional BS x 4 quadrants.    Inc: 3x LSC sites clean/dry/intact w/ steri strips and op-sites in place. Left opsite saturated and removed, no obvious bleeding noted from incision. Pressure dressing placed to be removed prior to d/c home.  Ext: SCD's in place and functional, non-tender b/l, no edema        A/P: 24y Female s/p LSC RO cystectomy. . Patient recovering appropriately.     Neuro: PO Analgesia PRN   CV: Hemodynamically stable. Monitor VS. H/H 12.4/37.8 from 12.4/41.8, overall appropriate.   Pulm: Saturating well on room air.  Encourage OOB and incentive spirometer use.   GI: Advance to regular diet. Anti-emetics PRN.  : DTV@9a.  FEN: LR@125cc/hr.  Heme: DVT ppx w/ SCD's while in bed. Early ambulation, initially with assistance then as tolerated.   ID: Afebrile  Endo: ISS vs. No active issues     Dispo: Discharge from PACU when criteria met.     HENRY Acuña, PGY-2

## 2024-10-16 ENCOUNTER — NON-APPOINTMENT (OUTPATIENT)
Age: 24
End: 2024-10-16

## 2024-10-17 ENCOUNTER — APPOINTMENT (OUTPATIENT)
Dept: OBGYN | Facility: CLINIC | Age: 24
End: 2024-10-17

## 2024-10-18 ENCOUNTER — NON-APPOINTMENT (OUTPATIENT)
Age: 24
End: 2024-10-18

## 2024-11-29 ENCOUNTER — INPATIENT (INPATIENT)
Facility: HOSPITAL | Age: 24
LOS: 9 days | Discharge: HOME | End: 2024-12-09
Attending: HOSPITALIST | Admitting: HOSPITALIST
Payer: MEDICAID

## 2024-11-29 VITALS
HEART RATE: 120 BPM | SYSTOLIC BLOOD PRESSURE: 152 MMHG | DIASTOLIC BLOOD PRESSURE: 86 MMHG | RESPIRATION RATE: 20 BRPM | TEMPERATURE: 101 F | OXYGEN SATURATION: 89 % | WEIGHT: 160.06 LBS

## 2024-11-29 DIAGNOSIS — J18.9 PNEUMONIA, UNSPECIFIED ORGANISM: ICD-10-CM

## 2024-11-29 DIAGNOSIS — Z29.9 ENCOUNTER FOR PROPHYLACTIC MEASURES, UNSPECIFIED: ICD-10-CM

## 2024-11-29 DIAGNOSIS — A41.9 SEPSIS, UNSPECIFIED ORGANISM: ICD-10-CM

## 2024-11-29 DIAGNOSIS — R74.01 ELEVATION OF LEVELS OF LIVER TRANSAMINASE LEVELS: ICD-10-CM

## 2024-11-29 LAB
ADD ON TEST-SPECIMEN IN LAB: SIGNIFICANT CHANGE UP
ADD ON TEST-SPECIMEN IN LAB: SIGNIFICANT CHANGE UP
ALBUMIN SERPL ELPH-MCNC: 3.4 G/DL — SIGNIFICANT CHANGE UP (ref 3.3–5)
ALP SERPL-CCNC: 58 U/L — SIGNIFICANT CHANGE UP (ref 40–120)
ALT FLD-CCNC: 96 U/L — HIGH (ref 4–33)
ANION GAP SERPL CALC-SCNC: 17 MMOL/L — HIGH (ref 7–14)
APPEARANCE UR: ABNORMAL
AST SERPL-CCNC: 87 U/L — HIGH (ref 4–32)
BACTERIA # UR AUTO: ABNORMAL /HPF
BASOPHILS # BLD AUTO: 0 K/UL — SIGNIFICANT CHANGE UP (ref 0–0.2)
BASOPHILS NFR BLD AUTO: 0 % — SIGNIFICANT CHANGE UP (ref 0–2)
BILIRUB SERPL-MCNC: 0.3 MG/DL — SIGNIFICANT CHANGE UP (ref 0.2–1.2)
BILIRUB UR-MCNC: NEGATIVE — SIGNIFICANT CHANGE UP
BLOOD GAS VENOUS COMPREHENSIVE RESULT: SIGNIFICANT CHANGE UP
BUN SERPL-MCNC: 9 MG/DL — SIGNIFICANT CHANGE UP (ref 7–23)
CALCIUM SERPL-MCNC: 8.9 MG/DL — SIGNIFICANT CHANGE UP (ref 8.4–10.5)
CAST: 1 /LPF — SIGNIFICANT CHANGE UP (ref 0–4)
CHLORIDE SERPL-SCNC: 98 MMOL/L — SIGNIFICANT CHANGE UP (ref 98–107)
CO2 SERPL-SCNC: 20 MMOL/L — LOW (ref 22–31)
COLOR SPEC: SIGNIFICANT CHANGE UP
CREAT SERPL-MCNC: 0.57 MG/DL — SIGNIFICANT CHANGE UP (ref 0.5–1.3)
DIFF PNL FLD: ABNORMAL
EGFR: 131 ML/MIN/1.73M2 — SIGNIFICANT CHANGE UP
EOSINOPHIL # BLD AUTO: 0.14 K/UL — SIGNIFICANT CHANGE UP (ref 0–0.5)
EOSINOPHIL NFR BLD AUTO: 1.8 % — SIGNIFICANT CHANGE UP (ref 0–6)
FLUAV AG NPH QL: SIGNIFICANT CHANGE UP
FLUBV AG NPH QL: SIGNIFICANT CHANGE UP
GIANT PLATELETS BLD QL SMEAR: PRESENT — SIGNIFICANT CHANGE UP
GLUCOSE SERPL-MCNC: 127 MG/DL — HIGH (ref 70–99)
GLUCOSE UR QL: NEGATIVE MG/DL — SIGNIFICANT CHANGE UP
HCG SERPL-ACNC: <1 MIU/ML — SIGNIFICANT CHANGE UP
HCT VFR BLD CALC: 36.4 % — SIGNIFICANT CHANGE UP (ref 34.5–45)
HGB BLD-MCNC: 12.2 G/DL — SIGNIFICANT CHANGE UP (ref 11.5–15.5)
IANC: 5.76 K/UL — SIGNIFICANT CHANGE UP (ref 1.8–7.4)
KETONES UR-MCNC: ABNORMAL MG/DL
LEUKOCYTE ESTERASE UR-ACNC: NEGATIVE — SIGNIFICANT CHANGE UP
LYMPHOCYTES # BLD AUTO: 0.77 K/UL — LOW (ref 1–3.3)
LYMPHOCYTES # BLD AUTO: 9.7 % — LOW (ref 13–44)
MCHC RBC-ENTMCNC: 27.4 PG — SIGNIFICANT CHANGE UP (ref 27–34)
MCHC RBC-ENTMCNC: 33.5 G/DL — SIGNIFICANT CHANGE UP (ref 32–36)
MCV RBC AUTO: 81.8 FL — SIGNIFICANT CHANGE UP (ref 80–100)
MONOCYTES # BLD AUTO: 0.21 K/UL — SIGNIFICANT CHANGE UP (ref 0–0.9)
MONOCYTES NFR BLD AUTO: 2.6 % — SIGNIFICANT CHANGE UP (ref 2–14)
NEUTROPHILS # BLD AUTO: 6.62 K/UL — SIGNIFICANT CHANGE UP (ref 1.8–7.4)
NEUTROPHILS NFR BLD AUTO: 80.7 % — HIGH (ref 43–77)
NEUTS BAND # BLD: 2.6 % — SIGNIFICANT CHANGE UP (ref 0–6)
NITRITE UR-MCNC: NEGATIVE — SIGNIFICANT CHANGE UP
PH UR: 6 — SIGNIFICANT CHANGE UP (ref 5–8)
PLAT MORPH BLD: NORMAL — SIGNIFICANT CHANGE UP
PLATELET # BLD AUTO: 189 K/UL — SIGNIFICANT CHANGE UP (ref 150–400)
PLATELET COUNT - ESTIMATE: NORMAL — SIGNIFICANT CHANGE UP
POLYCHROMASIA BLD QL SMEAR: SLIGHT — SIGNIFICANT CHANGE UP
POTASSIUM SERPL-MCNC: 3.8 MMOL/L — SIGNIFICANT CHANGE UP (ref 3.5–5.3)
POTASSIUM SERPL-SCNC: 3.8 MMOL/L — SIGNIFICANT CHANGE UP (ref 3.5–5.3)
PROT SERPL-MCNC: 8.1 G/DL — SIGNIFICANT CHANGE UP (ref 6–8.3)
PROT UR-MCNC: 300 MG/DL
RBC # BLD: 4.45 M/UL — SIGNIFICANT CHANGE UP (ref 3.8–5.2)
RBC # FLD: 12.6 % — SIGNIFICANT CHANGE UP (ref 10.3–14.5)
RBC BLD AUTO: NORMAL — SIGNIFICANT CHANGE UP
RBC CASTS # UR COMP ASSIST: 3 /HPF — SIGNIFICANT CHANGE UP (ref 0–4)
RSV RNA NPH QL NAA+NON-PROBE: SIGNIFICANT CHANGE UP
SARS-COV-2 RNA SPEC QL NAA+PROBE: SIGNIFICANT CHANGE UP
SODIUM SERPL-SCNC: 135 MMOL/L — SIGNIFICANT CHANGE UP (ref 135–145)
SP GR SPEC: 1.03 — HIGH (ref 1–1.03)
SQUAMOUS # UR AUTO: 11 /HPF — HIGH (ref 0–5)
UROBILINOGEN FLD QL: 1 MG/DL — SIGNIFICANT CHANGE UP (ref 0.2–1)
VARIANT LYMPHS # BLD: 2.6 % — SIGNIFICANT CHANGE UP (ref 0–6)
WBC # BLD: 7.95 K/UL — SIGNIFICANT CHANGE UP (ref 3.8–10.5)
WBC # FLD AUTO: 7.95 K/UL — SIGNIFICANT CHANGE UP (ref 3.8–10.5)
WBC UR QL: 1 /HPF — SIGNIFICANT CHANGE UP (ref 0–5)

## 2024-11-29 PROCEDURE — 99285 EMERGENCY DEPT VISIT HI MDM: CPT

## 2024-11-29 PROCEDURE — 99223 1ST HOSP IP/OBS HIGH 75: CPT

## 2024-11-29 PROCEDURE — 71046 X-RAY EXAM CHEST 2 VIEWS: CPT | Mod: 26

## 2024-11-29 PROCEDURE — 71250 CT THORAX DX C-: CPT | Mod: 26

## 2024-11-29 RX ORDER — CEFTRIAXONE SODIUM 1 G
1000 VIAL (EA) INJECTION EVERY 24 HOURS
Refills: 0 | Status: DISCONTINUED | OUTPATIENT
Start: 2024-11-29 | End: 2024-12-01

## 2024-11-29 RX ORDER — 0.9 % SODIUM CHLORIDE 0.9 %
1000 INTRAVENOUS SOLUTION INTRAVENOUS
Refills: 0 | Status: DISCONTINUED | OUTPATIENT
Start: 2024-11-29 | End: 2024-11-29

## 2024-11-29 RX ORDER — CEFTRIAXONE SODIUM 1 G
1000 VIAL (EA) INJECTION ONCE
Refills: 0 | Status: COMPLETED | OUTPATIENT
Start: 2024-11-29 | End: 2024-11-29

## 2024-11-29 RX ORDER — SODIUM CHLORIDE 9 MG/ML
1000 INJECTION, SOLUTION INTRAMUSCULAR; INTRAVENOUS; SUBCUTANEOUS ONCE
Refills: 0 | Status: COMPLETED | OUTPATIENT
Start: 2024-11-29 | End: 2024-11-29

## 2024-11-29 RX ORDER — ACETAMINOPHEN 500MG 500 MG/1
1000 TABLET, COATED ORAL ONCE
Refills: 0 | Status: COMPLETED | OUTPATIENT
Start: 2024-11-29 | End: 2024-11-29

## 2024-11-29 RX ORDER — ACETAMINOPHEN 500MG 500 MG/1
650 TABLET, COATED ORAL EVERY 6 HOURS
Refills: 0 | Status: DISCONTINUED | OUTPATIENT
Start: 2024-11-29 | End: 2024-12-01

## 2024-11-29 RX ORDER — GUAIFENESIN 400 MG
200 TABLET ORAL EVERY 6 HOURS
Refills: 0 | Status: COMPLETED | OUTPATIENT
Start: 2024-11-29 | End: 2024-12-02

## 2024-11-29 RX ORDER — 0.9 % SODIUM CHLORIDE 0.9 %
1000 INTRAVENOUS SOLUTION INTRAVENOUS
Refills: 0 | Status: DISCONTINUED | OUTPATIENT
Start: 2024-11-29 | End: 2024-12-01

## 2024-11-29 RX ORDER — PIPERACILLIN SODIUM AND TAZOBACTAM SODIUM 4; .5 G/20ML; G/20ML
3.38 INJECTION, POWDER, LYOPHILIZED, FOR SOLUTION INTRAVENOUS ONCE
Refills: 0 | Status: DISCONTINUED | OUTPATIENT
Start: 2024-11-29 | End: 2024-11-29

## 2024-11-29 RX ORDER — BENZONATATE 100 MG/1
100 CAPSULE ORAL EVERY 8 HOURS
Refills: 0 | Status: DISCONTINUED | OUTPATIENT
Start: 2024-11-29 | End: 2024-11-29

## 2024-11-29 RX ORDER — DOXYCYCLINE HYCLATE 150 MG/1
100 TABLET, COATED ORAL EVERY 12 HOURS
Refills: 0 | Status: DISCONTINUED | OUTPATIENT
Start: 2024-11-29 | End: 2024-12-01

## 2024-11-29 RX ORDER — BENZONATATE 100 MG/1
100 CAPSULE ORAL EVERY 8 HOURS
Refills: 0 | Status: COMPLETED | OUTPATIENT
Start: 2024-11-29 | End: 2024-12-03

## 2024-11-29 RX ORDER — VANCOMYCIN HCL 900 MCG/MG
1000 POWDER (GRAM) MISCELLANEOUS ONCE
Refills: 0 | Status: DISCONTINUED | OUTPATIENT
Start: 2024-11-29 | End: 2024-11-29

## 2024-11-29 RX ORDER — LEVALBUTEROL 0.63 MG/3ML
0.63 SOLUTION RESPIRATORY (INHALATION) ONCE
Refills: 0 | Status: COMPLETED | OUTPATIENT
Start: 2024-11-29 | End: 2024-11-29

## 2024-11-29 RX ORDER — DOXYCYCLINE HYCLATE 150 MG/1
100 TABLET, COATED ORAL ONCE
Refills: 0 | Status: COMPLETED | OUTPATIENT
Start: 2024-11-29 | End: 2024-11-29

## 2024-11-29 RX ADMIN — DOXYCYCLINE HYCLATE 100 MILLIGRAM(S): 150 TABLET, COATED ORAL at 02:07

## 2024-11-29 RX ADMIN — ACETAMINOPHEN 500MG 650 MILLIGRAM(S): 500 TABLET, COATED ORAL at 17:05

## 2024-11-29 RX ADMIN — SODIUM CHLORIDE 1000 MILLILITER(S): 9 INJECTION, SOLUTION INTRAMUSCULAR; INTRAVENOUS; SUBCUTANEOUS at 01:29

## 2024-11-29 RX ADMIN — LEVALBUTEROL 0.63 MILLIGRAM(S): 0.63 SOLUTION RESPIRATORY (INHALATION) at 16:44

## 2024-11-29 RX ADMIN — DOXYCYCLINE HYCLATE 100 MILLIGRAM(S): 150 TABLET, COATED ORAL at 13:07

## 2024-11-29 RX ADMIN — Medication 200 MILLIGRAM(S): at 17:07

## 2024-11-29 RX ADMIN — ACETAMINOPHEN 500MG 650 MILLIGRAM(S): 500 TABLET, COATED ORAL at 05:04

## 2024-11-29 RX ADMIN — SODIUM CHLORIDE 1000 MILLILITER(S): 9 INJECTION, SOLUTION INTRAMUSCULAR; INTRAVENOUS; SUBCUTANEOUS at 11:26

## 2024-11-29 RX ADMIN — Medication 75 MILLILITER(S): at 12:32

## 2024-11-29 RX ADMIN — Medication 100 MILLIGRAM(S): at 01:38

## 2024-11-29 RX ADMIN — ACETAMINOPHEN 500MG 650 MILLIGRAM(S): 500 TABLET, COATED ORAL at 17:48

## 2024-11-29 RX ADMIN — ACETAMINOPHEN 500MG 650 MILLIGRAM(S): 500 TABLET, COATED ORAL at 12:09

## 2024-11-29 RX ADMIN — ACETAMINOPHEN 500MG 650 MILLIGRAM(S): 500 TABLET, COATED ORAL at 22:46

## 2024-11-29 RX ADMIN — ACETAMINOPHEN 500MG 650 MILLIGRAM(S): 500 TABLET, COATED ORAL at 23:46

## 2024-11-29 RX ADMIN — ACETAMINOPHEN 500MG 650 MILLIGRAM(S): 500 TABLET, COATED ORAL at 04:34

## 2024-11-29 RX ADMIN — BENZONATATE 100 MILLIGRAM(S): 100 CAPSULE ORAL at 13:08

## 2024-11-29 RX ADMIN — BENZONATATE 100 MILLIGRAM(S): 100 CAPSULE ORAL at 21:55

## 2024-11-29 RX ADMIN — BENZONATATE 100 MILLIGRAM(S): 100 CAPSULE ORAL at 04:34

## 2024-11-29 RX ADMIN — Medication 200 MILLIGRAM(S): at 12:08

## 2024-11-29 RX ADMIN — ACETAMINOPHEN 500MG 400 MILLIGRAM(S): 500 TABLET, COATED ORAL at 01:29

## 2024-11-29 RX ADMIN — Medication 100 MILLILITER(S): at 17:26

## 2024-11-29 RX ADMIN — Medication 100 MILLIGRAM(S): at 21:55

## 2024-11-29 RX ADMIN — Medication 200 MILLIGRAM(S): at 23:59

## 2024-11-29 RX ADMIN — ACETAMINOPHEN 500MG 650 MILLIGRAM(S): 500 TABLET, COATED ORAL at 10:39

## 2024-11-29 NOTE — ED PROVIDER NOTE - PHYSICAL EXAMINATION
Gen: AAOx3, ill appearing  HEENT: NCAT. PEERLA, EOMI, oral mucosa moist, normal conjunctiva  Lung: LLL ronchi, tachypneic, 3-4 word dyspnea  CV: tachy, no murmurs, rubs or gallops  Abd: soft, NTND, no guarding, no CVA tenderness  MSK: no visible deformities  Neuro: No focal sensory or motor deficits  Skin: Warm, well perfused, no rash  Psych: normal affect.

## 2024-11-29 NOTE — PATIENT PROFILE ADULT - TRANSPORTATION
Daughter called , concerned with weight. In the office it says 96 lbs, at home yesterday is was 92. Prior to surgery it was 79. Asking if this could be from salt tablets? Should she be on a diuretic? Does c/o being very tired   
I spoke to patient and she understands the message.   
Please encourage patient to continue with adequate nutrition as discussed with her nutritionist.  As patient's nutrition status improves hopefully we can discontinue salt tablets.  Will CC her nephrologist to this.  If patient complaining of any shortness of breath should be evaluated.
no

## 2024-11-29 NOTE — CHART NOTE - NSCHARTNOTEFT_GEN_A_CORE
Seen by me this morning, mother Jorge at bedside, c/o SOB, ongoing productive cough (white/yellow sputum), febrile to 102.6F this AM. No chest pain, decreased PO intake, feeling weak.    Tmax 102.6F 10AM, 106, 20, NRM w/ good O2 Sat  Ill appearing, alert but looking weak  Oral mucosa is dry  Chest: Decreased BS at bases  Heart: Tachycardic, S1S2 Ok  Abd: Soft/NT/ND  Extrem: No edema    Labs reviewed    24 F w/ hx ovarian cyst presents to ED complaining of shortness of breath, fever x1 week. Pt admitted for sepsis w/ acute hypoxic respiratory failure 2/2 pna.     # Sepsis with acute hypoxic respiratory failure  Pt febrile, tachy, hypoxic to 89% on RA requiring 2L NC. Likely 2/2 pna. No improvement on azithromycin outpatient (took if for 3 days). s/p IV ceftriaxone and IV doxycycline in ED  - c/w IV ceftriaxone and IV doxy  - f/u urine legionella/Strep pneumoniae Ag  - F/up BCx's and sputum culture  - CT chest obtained 11/29 to r/o effusion/other given degree of hypoxemia: Extensive consolidation throughout the lungs bilaterally, but most severe in the left lung. Findings are compatible with pneumonia. Very small left pleural effusion  - Wean off O2 as tolerated, currently on NRM  - Giving IVF's as tachycardic/dry on exam/septic  - Repeat CT chest follow-up in one month to ensure clearing    # Sepsis due to pneumonia.   - c/w IV ceftriaxone and doxy    # Transaminitis.  Mildly elevated, but sample is hemolyzed  - Could be in sepsis setting and/or hemolyzed sample  - continue to trend  - hep panel and RUQ if not improved    # Prophylactic measure.   Pt ambulatory, low risk    Discussed with Mother Jorge at bedside at length on 11/29. Seen by me this morning, mother Jorge at bedside, c/o SOB, ongoing productive cough (white/yellow sputum), febrile to 102.6F this AM. No chest pain, decreased PO intake, feeling weak.    Tmax 102.6F 10AM, 106, 20, NRM w/ good O2 Sat  Ill appearing, alert but looking weak  Oral mucosa is dry  Chest: Decreased BS at bases  Heart: Tachycardic, S1S2 Ok  Abd: Soft/NT/ND  Extrem: No edema    Labs reviewed    24 F w/ hx ovarian cyst presents to ED complaining of shortness of breath, fever x1 week. Pt admitted for sepsis w/ acute hypoxic respiratory failure 2/2 pna.     # Sepsis with acute hypoxic respiratory failure  Pt febrile, tachy, hypoxic to 89% on RA requiring 2L NC. Likely 2/2 pna. No improvement on azithromycin outpatient (took if for 3 days). s/p IV ceftriaxone and IV doxycycline in ED  - c/w IV ceftriaxone and IV doxy  - f/u urine legionella/Strep pneumoniae Ag, HIV neg back in 9/24  - F/up BCx's and sputum culture  - CT chest obtained 11/29 to r/o effusion/other given degree of hypoxemia: Extensive consolidation throughout the lungs bilaterally, but most severe in the left lung. Findings are compatible with pneumonia. Very small left pleural effusion  - Wean off O2 as tolerated, currently on NRM  - Giving IVF's as tachycardic/dry on exam/septic  - Tessalon/robitussin ATC for few days  - Repeat CT chest follow-up in one month to ensure clearing    # Sepsis due to pneumonia.   - c/w IV ceftriaxone and doxy    # Transaminitis.  Mildly elevated, but sample is hemolyzed  - Could be in sepsis setting and/or hemolyzed sample  - continue to trend  - hep panel and RUQ if not improved    # Prophylactic measure.   Pt ambulatory, low risk    Discussed with Mother Jorge at bedside at length on 11/29.

## 2024-11-29 NOTE — ED ADULT TRIAGE NOTE - CHIEF COMPLAINT QUOTE
Pt c/o sob,  body aches, fevers, cough and congestion X 1 week. Spo2 89% on RA, missy to 95% on NC.  No chest pain, abd pain, n/v/d.  Denies Hx

## 2024-11-29 NOTE — H&P ADULT - NSHPLABSRESULTS_GEN_ALL_CORE
.  LABS:                         12.2   7.95  )-----------( 189      ( 29 Nov 2024 01:10 )             36.4     11-29    135  |  98  |  9   ----------------------------<  127[H]  3.8   |  20[L]  |  0.57    Ca    8.9      29 Nov 2024 01:10    TPro  8.1  /  Alb  3.4  /  TBili  0.3  /  DBili  x   /  AST  87[H]  /  ALT  96[H]  /  AlkPhos  58  11-29      Urinalysis Basic - ( 29 Nov 2024 01:10 )    Color: x / Appearance: x / SG: x / pH: x  Gluc: 127 mg/dL / Ketone: x  / Bili: x / Urobili: x   Blood: x / Protein: x / Nitrite: x   Leuk Esterase: x / RBC: x / WBC x   Sq Epi: x / Non Sq Epi: x / Bacteria: x                RADIOLOGY, EKG & ADDITIONAL TESTS: Reviewed.

## 2024-11-29 NOTE — H&P ADULT - HISTORY OF PRESENT ILLNESS
Pt is a 24 F w/ hx ovarian cyst presents to ED complaining of shortness of breath, fever x1 week.     In ED, vitals T101.1, , /86, RR 20, O2 sat 89% on RA --> 95% on 2L NC  Labs significant for: ast/alt 81/96  EKG personally reviewed:   CXR: prelim read: LLL pna  ED management: IV ceftriaxone, IV doxy, IV tylenol, 1 L NS Pt is a 24 F w/ hx ovarian cyst presents to ED complaining of shortness of breath, cough and fever x1 week. Pt states she went to Urgent care and pcp and was prescribed azithromycin. She states she took 3 days of it however symptoms did not improve. She states she continues to have fever and cough. She reports chest pain w/ cough. Denies abd pain, n/v/d, or urinary symptoms. She denies any sick contacts or recent travel.     In ED, vitals T101.1, , /86, RR 20, O2 sat 89% on RA --> 95% on 2L NC  Labs significant for: ast/alt 81/96  EKG personally reviewed: sinus tachycardia, no acute ischemic changes   CXR: prelim read: LLL pna  ED management: IV ceftriaxone, IV doxy, IV tylenol, 1 L NS

## 2024-11-29 NOTE — H&P ADULT - PROBLEM SELECTOR PLAN 1
normal appearance , without tenderness upon palpation , no deformities , trachea midline , Thyroid normal size , no masses , thyroid nontender Pt febrile, tachy, hypoxic to 89% on RA requiring 2L NC. Likely 2/2 pna. No improvement on azithromycin outpatient. s/p IV ceftriaxone and IV doxycycline in ED  - c/w IV ceftriaxone and IV doxy  - f/u urine legionella, bcx  - wean O2 as tolerated

## 2024-11-29 NOTE — ED PROVIDER NOTE - PROGRESS NOTE DETAILS
Sneddon, DO: Labs largely nonactionable, CBC without leukocytosis or anemia.  CMP with elevated anion gap, mild transaminitis.  VBG with respiratory alkalosis.  Chest x-ray shows large left lower lobe pneumonia.  Given continued hypoxemia, admit for further management. Oly DO: Patient to be admitted to hospitalist under .  Patient's hemodynamics improved with current fluid bolus, patient reassessed for fluid status, determined to be euvolemic at this time.  No indication for further fluid resuscitation.

## 2024-11-29 NOTE — H&P ADULT - PROBLEM SELECTOR PLAN 3
DVT prophylaxis: pt ambulatory  Diet: regulatory DVT prophylaxis: pt ambulatory, low risk  Diet: regular mildly elevated  - continue to trend  - hep panel and ruq if not improved

## 2024-11-29 NOTE — H&P ADULT - NSHPPHYSICALEXAM_GEN_ALL_CORE
VITAL SIGNS:  T(C): 37.8 (11-29-24 @ 02:23), Max: 38.4 (11-29-24 @ 00:28)  T(F): 100 (11-29-24 @ 02:23), Max: 101.1 (11-29-24 @ 00:28)  HR: 104 (11-29-24 @ 02:23) (104 - 120)  BP: 122/86 (11-29-24 @ 02:23) (122/86 - 152/86)  BP(mean): --  RR: 25 (11-29-24 @ 02:23) (20 - 25)  SpO2: 100% (11-29-24 @ 02:23) (89% - 100%)  Wt(kg): --    PHYSICAL EXAM:    Constitutional: resting comfortably in bed; NAD  Head: NC/AT  Eyes: PERRL, EOMI, anicteric sclera  ENT: no nasal discharge; uvula midline, no oropharyngeal erythema or exudates; MMM  Neck: supple  Respiratory: CTA B/L; no W/R/R, no retractions  Cardiac: +S1/S2; RRR; no M/R/G  Gastrointestinal: abdomen soft, NT/ND; no rebound or guarding; +BSx4  Back: spine midline, no bony tenderness  Extremities: WWP, no clubbing or cyanosis; no peripheral edema  Musculoskeletal: NROM x4; no joint swelling, tenderness or erythema  Vascular: distal pulses intact  Dermatologic: skin warm, dry and intact; no rashes  Lymphatic: no submandibular or cervical LAD  Neurologic: AAOx3; moves all 4 extremities  Psychiatric: affect and characteristics of appearance, verbalizations, behaviors are appropriate

## 2024-11-29 NOTE — ED PROVIDER NOTE - CLINICAL SUMMARY MEDICAL DECISION MAKING FREE TEXT BOX
24F, no PMHx, presents to ED complaining of shortness of breath, fever.  Patient states that approximately 1 week ago, she developed fever, unknown Tmax, productive cough, intermittent chest pain with coughing, myalgias, headache.  Patient was seen by both urgent care and PCP, prescribed azithromycin, promethazine, albuterol, all without improvement in conditions.  Patient states that tonight, symptoms continue to worsen, prompting ED evaluation.  Denies consistent chest pain, abdominal pain, N/V/D/C, urinary/bowel symptoms, trauma.    Vitals:  /86  Resp 24 labored    Temp 101.1 oral  SpO2 95% 2 L nasal cannula    Story, physical exam consistent with pneumonia.  Low concern for PE given febrile state, productive cough.  Patient failing outpatient management, continues to be hypoxemic when not on oxygen.  Despite likely pneumonia, will evaluate for other infectious etiologies.  Will acquire labs, CXR, UA, will treat empirically with doxycycline.  Fluid resuscitation.  Likely admit.

## 2024-11-29 NOTE — ED PROVIDER NOTE - ATTENDING CONTRIBUTION TO CARE
s/p T10-L5 fusion and L1-L5 laminectomy 24-year-old otherwise healthy female presenting with 1 week of fever, body aches, cough.  She was initially seen at urgent care and by her PMD on 11/26, started on Proventil, promethazine, and a Z-[place].  Patient has taken 2 doses of the antibiotics.  She continues to have severe cough productive of yellowish sputum, chest pain with coughing.  Denies associated neck pain or stiffness, rash, abdominal pain, urinary symptoms, N/V/D.    Pt lying in stretcher, awake and alert, nontoxic.  Febrile, tachycardic, tachypneic to low 20s, hypoxia to 92% on RA (improved to high 90s on 2L nC), pt is speaking in full sentences although slightly dyspneic with speech.  Lungs cta bl with poor effort but decreased with ronchi at L lung base, no paradoxical wall motion.  Cards nl S1/S2, tachy reg, no MRG.  Abd soft ntnd.  No pedal edema or calf tenderness.    Patient is tachycardic, tachypneic, febrile meeting SIRS criteria for sepsis.  The high suspicion of a respiratory etiology, will start community-acquired pneumonia coverage.  Also consider viral URI.  Plan for labs, UCG, UA, cultures, IV fluids, antipyretics, antibiotics.  Patient with mild hypoxia to 92% on room air, improved to high 90s with 2 L nasal cannula.  In the setting of sepsis with hypoxia patient will require inpatient admission.

## 2024-11-29 NOTE — H&P ADULT - TIME BILLING
I have spent a total of 75 minutes time spent to prepare to see the patient, obtaining and reviewing history, physical examination, explaining the diagnosis, prognosis and treatment plan with the patient/family/caregiver. I also have spent the time ordering studies and testing, interpreting results, medicine reconciliation, and documentation as above.

## 2024-11-29 NOTE — ED ADULT NURSE NOTE - NSFALLUNIVINTERV_ED_ALL_ED
Bed/Stretcher in lowest position, wheels locked, appropriate side rails in place/Call bell, personal items and telephone in reach/Instruct patient to call for assistance before getting out of bed/chair/stretcher/Non-slip footwear applied when patient is off stretcher/[place] to call system/Physically safe environment - no spills, clutter or unnecessary equipment/Purposeful proactive rounding/Room/bathroom lighting operational, light cord in reach

## 2024-11-29 NOTE — ED ADULT NURSE NOTE - OBJECTIVE STATEMENT
Pt received in spot 14. A&O4. Ambulatory. PMH ovarian cyst. Came into ED with complaints of SOB x 1 week. States being seen by urgent care and PCP placed on antibiotics and cough medicine with no improvement. Today SOB became worse. Endorsing subjective fevers, cough, SOB. Denies vomiting, dizziness, chest pain. 20g IV placed right ac. labs drawn and sent. Medicated per MD orders. Pt appears tachypneic and Sinus tachycardic on monitor. Pending LabR and imaging. Stretcher in lowest position. Call bell within reach.

## 2024-11-29 NOTE — ED ADULT NURSE REASSESSMENT NOTE - NS ED NURSE REASSESS COMMENT FT1
Break coverage RN. Patient A&Ox4, lethargic resting in stretcher, respirations even and unlabored. Vitals stable. Patient states improvement in condition. Patient and family aware of admission to hospital, awaiting bed assignment. IV line intact and patent. Stretcher in lowest position, wheels locked, appropriate side rails in place, call bell in reach.

## 2024-11-29 NOTE — PATIENT PROFILE ADULT - FALL HARM RISK - HARM RISK INTERVENTIONS

## 2024-11-29 NOTE — H&P ADULT - NSHPREVIEWOFSYSTEMS_GEN_ALL_CORE
REVIEW OF SYSTEMS:    CONSTITUTIONAL: No weakness. + fevers or chills  EYES/ENT: No visual changes;  No vertigo or throat pain   NECK: No pain or stiffness  RESPIRATORY: + cough. No wheezing, hemoptysis; + shortness of breath  CARDIOVASCULAR: No chest pain or palpitations  GASTROINTESTINAL: No abdominal or epigastric pain. No nausea, vomiting, or hematemesis; No diarrhea or constipation. No melena or hematochezia.  GENITOURINARY: No dysuria, frequency or hematuria  NEUROLOGICAL: No numbness or weakness  SKIN: No itching, rashes

## 2024-11-29 NOTE — H&P ADULT - ASSESSMENT
Pt is a 24 F w/ hx ovarian cyst presents to ED complaining of shortness of breath, fever x1 week. Pt admitted for sepsis w/ acute hypoxic respiratory failure 2/2 pna.

## 2024-11-30 LAB
ALBUMIN SERPL ELPH-MCNC: 3.3 G/DL — SIGNIFICANT CHANGE UP (ref 3.3–5)
ALP SERPL-CCNC: 57 U/L — SIGNIFICANT CHANGE UP (ref 40–120)
ALT FLD-CCNC: 108 U/L — HIGH (ref 4–33)
ANION GAP SERPL CALC-SCNC: 15 MMOL/L — HIGH (ref 7–14)
ANISOCYTOSIS BLD QL: SLIGHT — SIGNIFICANT CHANGE UP
AST SERPL-CCNC: 98 U/L — HIGH (ref 4–32)
BASOPHILS # BLD AUTO: 0.09 K/UL — SIGNIFICANT CHANGE UP (ref 0–0.2)
BASOPHILS NFR BLD AUTO: 0.9 % — SIGNIFICANT CHANGE UP (ref 0–2)
BILIRUB SERPL-MCNC: 0.3 MG/DL — SIGNIFICANT CHANGE UP (ref 0.2–1.2)
BUN SERPL-MCNC: 6 MG/DL — LOW (ref 7–23)
CALCIUM SERPL-MCNC: 8.7 MG/DL — SIGNIFICANT CHANGE UP (ref 8.4–10.5)
CHLORIDE SERPL-SCNC: 100 MMOL/L — SIGNIFICANT CHANGE UP (ref 98–107)
CO2 SERPL-SCNC: 22 MMOL/L — SIGNIFICANT CHANGE UP (ref 22–31)
CREAT SERPL-MCNC: 0.46 MG/DL — LOW (ref 0.5–1.3)
EGFR: 138 ML/MIN/1.73M2 — SIGNIFICANT CHANGE UP
EOSINOPHIL # BLD AUTO: 0.09 K/UL — SIGNIFICANT CHANGE UP (ref 0–0.5)
EOSINOPHIL NFR BLD AUTO: 0.9 % — SIGNIFICANT CHANGE UP (ref 0–6)
GIANT PLATELETS BLD QL SMEAR: PRESENT — SIGNIFICANT CHANGE UP
GLUCOSE SERPL-MCNC: 95 MG/DL — SIGNIFICANT CHANGE UP (ref 70–99)
GRAM STN FLD: ABNORMAL
HCT VFR BLD CALC: 38.3 % — SIGNIFICANT CHANGE UP (ref 34.5–45)
HGB BLD-MCNC: 12.6 G/DL — SIGNIFICANT CHANGE UP (ref 11.5–15.5)
IANC: 6.85 K/UL — SIGNIFICANT CHANGE UP (ref 1.8–7.4)
LEGIONELLA AG UR QL: NEGATIVE — SIGNIFICANT CHANGE UP
LYMPHOCYTES # BLD AUTO: 1.29 K/UL — SIGNIFICANT CHANGE UP (ref 1–3.3)
LYMPHOCYTES # BLD AUTO: 13.5 % — SIGNIFICANT CHANGE UP (ref 13–44)
MAGNESIUM SERPL-MCNC: 2 MG/DL — SIGNIFICANT CHANGE UP (ref 1.6–2.6)
MANUAL SMEAR VERIFICATION: SIGNIFICANT CHANGE UP
MCHC RBC-ENTMCNC: 26.9 PG — LOW (ref 27–34)
MCHC RBC-ENTMCNC: 32.9 G/DL — SIGNIFICANT CHANGE UP (ref 32–36)
MCV RBC AUTO: 81.7 FL — SIGNIFICANT CHANGE UP (ref 80–100)
MONOCYTES # BLD AUTO: 0.34 K/UL — SIGNIFICANT CHANGE UP (ref 0–0.9)
MONOCYTES NFR BLD AUTO: 3.6 % — SIGNIFICANT CHANGE UP (ref 2–14)
MRSA PCR RESULT.: SIGNIFICANT CHANGE UP
NEUTROPHILS # BLD AUTO: 7.64 K/UL — HIGH (ref 1.8–7.4)
NEUTROPHILS NFR BLD AUTO: 76.6 % — SIGNIFICANT CHANGE UP (ref 43–77)
NEUTS BAND # BLD: 3.6 % — SIGNIFICANT CHANGE UP (ref 0–6)
PHOSPHATE SERPL-MCNC: 2.5 MG/DL — SIGNIFICANT CHANGE UP (ref 2.5–4.5)
PLAT MORPH BLD: NORMAL — SIGNIFICANT CHANGE UP
PLATELET # BLD AUTO: 134 K/UL — LOW (ref 150–400)
PLATELET COUNT - ESTIMATE: ABNORMAL
POIKILOCYTOSIS BLD QL AUTO: SLIGHT — SIGNIFICANT CHANGE UP
POLYCHROMASIA BLD QL SMEAR: SLIGHT — SIGNIFICANT CHANGE UP
POTASSIUM SERPL-MCNC: 3.8 MMOL/L — SIGNIFICANT CHANGE UP (ref 3.5–5.3)
POTASSIUM SERPL-SCNC: 3.8 MMOL/L — SIGNIFICANT CHANGE UP (ref 3.5–5.3)
PROT SERPL-MCNC: 7.6 G/DL — SIGNIFICANT CHANGE UP (ref 6–8.3)
RBC # BLD: 4.69 M/UL — SIGNIFICANT CHANGE UP (ref 3.8–5.2)
RBC # FLD: 12.9 % — SIGNIFICANT CHANGE UP (ref 10.3–14.5)
RBC BLD AUTO: ABNORMAL
S AUREUS DNA NOSE QL NAA+PROBE: SIGNIFICANT CHANGE UP
SMUDGE CELLS # BLD: PRESENT — SIGNIFICANT CHANGE UP
SODIUM SERPL-SCNC: 137 MMOL/L — SIGNIFICANT CHANGE UP (ref 135–145)
SPECIMEN SOURCE: SIGNIFICANT CHANGE UP
VARIANT LYMPHS # BLD: 0.9 % — SIGNIFICANT CHANGE UP (ref 0–6)
WBC # BLD: 9.52 K/UL — SIGNIFICANT CHANGE UP (ref 3.8–10.5)
WBC # FLD AUTO: 9.52 K/UL — SIGNIFICANT CHANGE UP (ref 3.8–10.5)

## 2024-11-30 PROCEDURE — 76705 ECHO EXAM OF ABDOMEN: CPT | Mod: 26

## 2024-11-30 PROCEDURE — 99233 SBSQ HOSP IP/OBS HIGH 50: CPT

## 2024-11-30 RX ORDER — CHLORHEXIDINE GLUCONATE 1.2 MG/ML
1 RINSE ORAL DAILY
Refills: 0 | Status: DISCONTINUED | OUTPATIENT
Start: 2024-11-30 | End: 2024-12-09

## 2024-11-30 RX ORDER — IPRATROPIUM BROMIDE AND ALBUTEROL SULFATE 2.5; .5 MG/3ML; MG/3ML
3 SOLUTION RESPIRATORY (INHALATION) EVERY 6 HOURS
Refills: 0 | Status: DISCONTINUED | OUTPATIENT
Start: 2024-11-30 | End: 2024-12-01

## 2024-11-30 RX ADMIN — Medication 200 MILLIGRAM(S): at 12:06

## 2024-11-30 RX ADMIN — ACETAMINOPHEN 500MG 650 MILLIGRAM(S): 500 TABLET, COATED ORAL at 15:31

## 2024-11-30 RX ADMIN — Medication 200 MILLIGRAM(S): at 18:17

## 2024-11-30 RX ADMIN — BENZONATATE 100 MILLIGRAM(S): 100 CAPSULE ORAL at 13:58

## 2024-11-30 RX ADMIN — ACETAMINOPHEN 500MG 650 MILLIGRAM(S): 500 TABLET, COATED ORAL at 05:26

## 2024-11-30 RX ADMIN — BENZONATATE 100 MILLIGRAM(S): 100 CAPSULE ORAL at 21:19

## 2024-11-30 RX ADMIN — Medication 200 MILLIGRAM(S): at 05:26

## 2024-11-30 RX ADMIN — Medication 100 MILLIGRAM(S): at 21:46

## 2024-11-30 RX ADMIN — ACETAMINOPHEN 500MG 650 MILLIGRAM(S): 500 TABLET, COATED ORAL at 15:01

## 2024-11-30 RX ADMIN — DOXYCYCLINE HYCLATE 100 MILLIGRAM(S): 150 TABLET, COATED ORAL at 13:58

## 2024-11-30 RX ADMIN — BENZONATATE 100 MILLIGRAM(S): 100 CAPSULE ORAL at 05:26

## 2024-11-30 RX ADMIN — DOXYCYCLINE HYCLATE 100 MILLIGRAM(S): 150 TABLET, COATED ORAL at 03:07

## 2024-11-30 RX ADMIN — IPRATROPIUM BROMIDE AND ALBUTEROL SULFATE 3 MILLILITER(S): 2.5; .5 SOLUTION RESPIRATORY (INHALATION) at 22:07

## 2024-11-30 RX ADMIN — CHLORHEXIDINE GLUCONATE 1 APPLICATION(S): 1.2 RINSE ORAL at 13:59

## 2024-11-30 NOTE — PROGRESS NOTE ADULT - SUBJECTIVE AND OBJECTIVE BOX
Dr. Jenelle Rai  Pager [phone]    PROGRESS NOTE:     Patient is a 24y old  Female who presents with a chief complaint of pna (29 Nov 2024 03:12)      SUBJECTIVE / OVERNIGHT EVENTS: denies chest pain, still sob/cough, weaned O2 from NRB to 6L this am  ADDITIONAL REVIEW OF SYSTEMS: febrile to 101.8F last night    MEDICATIONS  (STANDING):  benzonatate 100 milliGRAM(s) Oral every 8 hours  cefTRIAXone   IVPB 1000 milliGRAM(s) IV Intermittent every 24 hours  chlorhexidine 2% Cloths 1 Application(s) Topical daily  doxycycline IVPB 100 milliGRAM(s) IV Intermittent every 12 hours  guaiFENesin Oral Liquid (Sugar-Free) 200 milliGRAM(s) Oral every 6 hours  lactated ringers. 1000 milliLiter(s) (100 mL/Hr) IV Continuous <Continuous>    MEDICATIONS  (PRN):  acetaminophen     Tablet .. 650 milliGRAM(s) Oral every 6 hours PRN Temp greater or equal to 38C (100.4F), Mild Pain (1 - 3)      CAPILLARY BLOOD GLUCOSE        I&O's Summary    29 Nov 2024 07:01  -  30 Nov 2024 07:00  --------------------------------------------------------  IN: 1040 mL / OUT: 1000 mL / NET: 40 mL    30 Nov 2024 07:01  -  30 Nov 2024 13:05  --------------------------------------------------------  IN: 250 mL / OUT: 0 mL / NET: 250 mL        PHYSICAL EXAM:  Vital Signs Last 24 Hrs  T(C): 36.8 (30 Nov 2024 10:40), Max: 38.8 (30 Nov 2024 05:22)  T(F): 98.2 (30 Nov 2024 10:40), Max: 101.8 (30 Nov 2024 05:22)  HR: 97 (30 Nov 2024 10:40) (92 - 120)  BP: 143/105 (30 Nov 2024 10:40) (131/68 - 143/105)  BP(mean): --  RR: 18 (30 Nov 2024 10:40) (18 - 20)  SpO2: 100% (30 Nov 2024 10:40) (100% - 100%)    Parameters below as of 30 Nov 2024 10:40  Patient On (Oxygen Delivery Method): nasal cannula  O2 Flow (L/min): 6    CONSTITUTIONAL: nad  RESPIRATORY: decreased air entry b/l L>Rt, left lung crackle   CARDIOVASCULAR: Regular rate and rhythm, normal S1 and S2, no murmur/rub/gallop; No lower extremity edema; Peripheral pulses are 2+ bilaterally  ABDOMEN: Nontender to palpation, normoactive bowel sounds, no rebound/guarding; No hepatosplenomegaly  MUSCULOSKELETAL: no clubbing or cyanosis of digits; no joint swelling or tenderness to palpation  PSYCH: A+O to person, place, and time; affect appropriate    LABS:                        12.2   7.95  )-----------( 189      ( 29 Nov 2024 01:10 )             36.4     11-30    137  |  100  |  6[L]  ----------------------------<  95  3.8   |  22  |  0.46[L]    Ca    8.7      30 Nov 2024 11:30  Phos  2.5     11-30  Mg     2.00     11-30    TPro  7.6  /  Alb  3.3  /  TBili  0.3  /  DBili  x   /  AST  98[H]  /  ALT  108[H]  /  AlkPhos  57  11-30          Urinalysis Basic - ( 30 Nov 2024 11:30 )    Color: x / Appearance: x / SG: x / pH: x  Gluc: 95 mg/dL / Ketone: x  / Bili: x / Urobili: x   Blood: x / Protein: x / Nitrite: x   Leuk Esterase: x / RBC: x / WBC x   Sq Epi: x / Non Sq Epi: x / Bacteria: x        Culture - Sputum (collected 29 Nov 2024 20:00)  Source: .Sputum Sputum  Gram Stain (30 Nov 2024 06:41):    Rare polymorphonuclear leukocytes per low power field    Rare Squamous epithelial cells per low power field    Few-moderate Gram Negative Rods seen per oil power field    Few-moderate Gram positive cocci in pairs seen per oil power field    Few Gram Positive Rods seen per oil power field    Urinalysis with Rflx Culture (collected 29 Nov 2024 11:40)    Culture - Blood (collected 29 Nov 2024 06:55)  Source: .Blood BLOOD  Preliminary Report (30 Nov 2024 09:01):    No growth at 24 hours    Culture - Blood (collected 29 Nov 2024 01:10)  Source: .Blood BLOOD  Preliminary Report (30 Nov 2024 09:01):    No growth at 24 hours        RADIOLOGY & ADDITIONAL TESTS:  Results Reviewed:   Imaging Personally Reviewed:  < from: CT Chest No Cont (11.29.24 @ 16:38) >  Extensive consolidation throughout the lungs bilaterally, but most severe   in the left lung. Findings are compatible with pneumonia.    Very small left pleural effusion.    CT chest follow-up in one month recommended to ensure clearing.      Electrocardiogram Personally Reviewed:    COORDINATION OF CARE:  Care Discussed with Consultants/Other Providers [Y/N]:  Prior or Outpatient Records Reviewed [Y/N]:   Dr. Jenelle Rai  Pager [phone]    PROGRESS NOTE:     Patient is a 24y old  Female who presents with a chief complaint of pna (29 Nov 2024 03:12)      SUBJECTIVE / OVERNIGHT EVENTS: denies chest pain, still sob/cough, weaned O2 from NRB to 6L this am  ADDITIONAL REVIEW OF SYSTEMS: febrile to 101.8F last night    MEDICATIONS  (STANDING):  benzonatate 100 milliGRAM(s) Oral every 8 hours  cefTRIAXone   IVPB 1000 milliGRAM(s) IV Intermittent every 24 hours  chlorhexidine 2% Cloths 1 Application(s) Topical daily  doxycycline IVPB 100 milliGRAM(s) IV Intermittent every 12 hours  guaiFENesin Oral Liquid (Sugar-Free) 200 milliGRAM(s) Oral every 6 hours  lactated ringers. 1000 milliLiter(s) (100 mL/Hr) IV Continuous <Continuous>    MEDICATIONS  (PRN):  acetaminophen     Tablet .. 650 milliGRAM(s) Oral every 6 hours PRN Temp greater or equal to 38C (100.4F), Mild Pain (1 - 3)      CAPILLARY BLOOD GLUCOSE        I&O's Summary    29 Nov 2024 07:01  -  30 Nov 2024 07:00  --------------------------------------------------------  IN: 1040 mL / OUT: 1000 mL / NET: 40 mL    30 Nov 2024 07:01  -  30 Nov 2024 13:05  --------------------------------------------------------  IN: 250 mL / OUT: 0 mL / NET: 250 mL        PHYSICAL EXAM:  Vital Signs Last 24 Hrs  T(C): 36.8 (30 Nov 2024 10:40), Max: 38.8 (30 Nov 2024 05:22)  T(F): 98.2 (30 Nov 2024 10:40), Max: 101.8 (30 Nov 2024 05:22)  HR: 97 (30 Nov 2024 10:40) (92 - 120)  BP: 143/105 (30 Nov 2024 10:40) (131/68 - 143/105)  BP(mean): --  RR: 18 (30 Nov 2024 10:40) (18 - 20)  SpO2: 100% (30 Nov 2024 10:40) (100% - 100%)    Parameters below as of 30 Nov 2024 10:40  Patient On (Oxygen Delivery Method): nasal cannula  O2 Flow (L/min): 6    CONSTITUTIONAL: nad  RESPIRATORY: decreased air entry b/l L>Rt, left lung crackle   CARDIOVASCULAR: Regular rate and rhythm, normal S1 and S2, no murmur/rub/gallop; No lower extremity edema; Peripheral pulses are 2+ bilaterally  ABDOMEN: Nontender to palpation, normoactive bowel sounds, no rebound/guarding; No hepatosplenomegaly  MUSCULOSKELETAL: no clubbing or cyanosis of digits; no joint swelling or tenderness to palpation  PSYCH: A+O to person, place, and time; affect appropriate    LABS:                        12.2   7.95  )-----------( 189      ( 29 Nov 2024 01:10 )             36.4     11-30    137  |  100  |  6[L]  ----------------------------<  95  3.8   |  22  |  0.46[L]    Ca    8.7      30 Nov 2024 11:30  Phos  2.5     11-30  Mg     2.00     11-30    TPro  7.6  /  Alb  3.3  /  TBili  0.3  /  DBili  x   /  AST  98[H]  /  ALT  108[H]  /  AlkPhos  57  11-30          Urinalysis Basic - ( 30 Nov 2024 11:30 )    Color: x / Appearance: x / SG: x / pH: x  Gluc: 95 mg/dL / Ketone: x  / Bili: x / Urobili: x   Blood: x / Protein: x / Nitrite: x   Leuk Esterase: x / RBC: x / WBC x   Sq Epi: x / Non Sq Epi: x / Bacteria: x        Culture - Sputum (collected 29 Nov 2024 20:00)  Source: .Sputum Sputum  Gram Stain (30 Nov 2024 06:41):    Rare polymorphonuclear leukocytes per low power field    Rare Squamous epithelial cells per low power field    Few-moderate Gram Negative Rods seen per oil power field    Few-moderate Gram positive cocci in pairs seen per oil power field    Few Gram Positive Rods seen per oil power field    Urinalysis with Rflx Culture (collected 29 Nov 2024 11:40)    Culture - Blood (collected 29 Nov 2024 06:55)  Source: .Blood BLOOD  Preliminary Report (30 Nov 2024 09:01):    No growth at 24 hours    Culture - Blood (collected 29 Nov 2024 01:10)  Source: .Blood BLOOD  Preliminary Report (30 Nov 2024 09:01):    No growth at 24 hours        RADIOLOGY & ADDITIONAL TESTS:  Results Reviewed:   Imaging Personally Reviewed:  < from: US Abdomen Upper Quadrant Right (11.30.24 @ 16:30) >  Liver: Increased hepatic echotexture c/w steatosis. Posterior LEFT lobe   focal fatty sparing, corresponding to similar finding on CT  Bile ducts: Normal caliber. Common bile duct measures 6 mm.  Gallbladder: Within normal limits.  Pancreas: Visualized portions are within normal limits.  Right kidney: 11.1 cm. No hydronephrosis.  Ascites: None.  IVC: Visualized portions are within normal limits.    IMPRESSION:  Hepatic steatosis without focal abnormality.        < from: CT Chest No Cont (11.29.24 @ 16:38) >  Extensive consolidation throughout the lungs bilaterally, but most severe   in the left lung. Findings are compatible with pneumonia.    Very small left pleural effusion.    CT chest follow-up in one month recommended to ensure clearing.      Electrocardiogram Personally Reviewed:    COORDINATION OF CARE:  Care Discussed with Consultants/Other Providers [Y/N]:  Prior or Outpatient Records Reviewed [Y/N]:

## 2024-11-30 NOTE — PROGRESS NOTE ADULT - ASSESSMENT
Pt is a 24 F w/ hx ovarian cyst presents to ED complaining of shortness of breath, fever x1 week. Pt admitted for sepsis w/ acute hypoxic respiratory failure 2/2 pna.  The patient is a 66y Female complaining of nausea, vomiting, diarrhea. The patient is a [AgeY] [Gender] complaining of [CCCP trg chief cmplnt].

## 2024-11-30 NOTE — PROGRESS NOTE ADULT - PROBLEM SELECTOR PLAN 1
Pt febrile, tachy, hypoxic to 89% on RA requiring 2L NC. Likely 2/2 pna. No improvement on azithromycin outpatient. s/p IV ceftriaxone and IV doxycycline in ED  - c/w IV ceftriaxone and IV doxy  - f/u urine legionella, strep, bcx  - wean O2 as tolerated, weaned from NRB to 6L NC today, cont to taper as tolerated Pt febrile, tachy, hypoxic to 89% on RA requiring 2L NC. Likely 2/2 pna. No improvement on azithromycin outpatient. s/p IV ceftriaxone and IV doxycycline in ED. PCT 0.47  - c/w IV ceftriaxone and IV doxy  - f/u urine legionella, strep, bcx  - wean O2 as tolerated, weaned from NRB to 6L NC today, cont to taper as tolerated

## 2024-12-01 LAB
ALBUMIN SERPL ELPH-MCNC: 3 G/DL — LOW (ref 3.3–5)
ALP SERPL-CCNC: 54 U/L — SIGNIFICANT CHANGE UP (ref 40–120)
ALT FLD-CCNC: 141 U/L — HIGH (ref 4–33)
ANION GAP SERPL CALC-SCNC: 13 MMOL/L — SIGNIFICANT CHANGE UP (ref 7–14)
APTT BLD: 29.3 SEC — SIGNIFICANT CHANGE UP (ref 24.5–35.6)
AST SERPL-CCNC: 127 U/L — HIGH (ref 4–32)
BASOPHILS # BLD AUTO: 0.05 K/UL — SIGNIFICANT CHANGE UP (ref 0–0.2)
BASOPHILS NFR BLD AUTO: 0.5 % — SIGNIFICANT CHANGE UP (ref 0–2)
BILIRUB SERPL-MCNC: 0.3 MG/DL — SIGNIFICANT CHANGE UP (ref 0.2–1.2)
BLOOD GAS VENOUS COMPREHENSIVE RESULT: SIGNIFICANT CHANGE UP
BUN SERPL-MCNC: 6 MG/DL — LOW (ref 7–23)
CALCIUM SERPL-MCNC: 8.7 MG/DL — SIGNIFICANT CHANGE UP (ref 8.4–10.5)
CHLORIDE SERPL-SCNC: 100 MMOL/L — SIGNIFICANT CHANGE UP (ref 98–107)
CO2 SERPL-SCNC: 22 MMOL/L — SIGNIFICANT CHANGE UP (ref 22–31)
CREAT SERPL-MCNC: 0.45 MG/DL — LOW (ref 0.5–1.3)
CULTURE RESULTS: ABNORMAL
EGFR: 139 ML/MIN/1.73M2 — SIGNIFICANT CHANGE UP
EOSINOPHIL # BLD AUTO: 0.11 K/UL — SIGNIFICANT CHANGE UP (ref 0–0.5)
EOSINOPHIL NFR BLD AUTO: 1.2 % — SIGNIFICANT CHANGE UP (ref 0–6)
GLUCOSE BLDC GLUCOMTR-MCNC: 115 MG/DL — HIGH (ref 70–99)
GLUCOSE SERPL-MCNC: 113 MG/DL — HIGH (ref 70–99)
HCT VFR BLD CALC: 33.8 % — LOW (ref 34.5–45)
HGB BLD-MCNC: 11.5 G/DL — SIGNIFICANT CHANGE UP (ref 11.5–15.5)
IANC: 6.81 K/UL — SIGNIFICANT CHANGE UP (ref 1.8–7.4)
IMM GRANULOCYTES NFR BLD AUTO: 0.8 % — SIGNIFICANT CHANGE UP (ref 0–0.9)
INR BLD: 1.21 RATIO — HIGH (ref 0.85–1.16)
LYMPHOCYTES # BLD AUTO: 1.66 K/UL — SIGNIFICANT CHANGE UP (ref 1–3.3)
LYMPHOCYTES # BLD AUTO: 18.2 % — SIGNIFICANT CHANGE UP (ref 13–44)
MAGNESIUM SERPL-MCNC: 2 MG/DL — SIGNIFICANT CHANGE UP (ref 1.6–2.6)
MCHC RBC-ENTMCNC: 27.5 PG — SIGNIFICANT CHANGE UP (ref 27–34)
MCHC RBC-ENTMCNC: 34 G/DL — SIGNIFICANT CHANGE UP (ref 32–36)
MCV RBC AUTO: 80.9 FL — SIGNIFICANT CHANGE UP (ref 80–100)
MONOCYTES # BLD AUTO: 0.41 K/UL — SIGNIFICANT CHANGE UP (ref 0–0.9)
MONOCYTES NFR BLD AUTO: 4.5 % — SIGNIFICANT CHANGE UP (ref 2–14)
NEUTROPHILS # BLD AUTO: 6.81 K/UL — SIGNIFICANT CHANGE UP (ref 1.8–7.4)
NEUTROPHILS NFR BLD AUTO: 74.8 % — SIGNIFICANT CHANGE UP (ref 43–77)
NRBC # BLD: 0 /100 WBCS — SIGNIFICANT CHANGE UP (ref 0–0)
NRBC # FLD: 0 K/UL — SIGNIFICANT CHANGE UP (ref 0–0)
PHOSPHATE SERPL-MCNC: 2.6 MG/DL — SIGNIFICANT CHANGE UP (ref 2.5–4.5)
PLATELET # BLD AUTO: 114 K/UL — LOW (ref 150–400)
POTASSIUM SERPL-MCNC: 3.8 MMOL/L — SIGNIFICANT CHANGE UP (ref 3.5–5.3)
POTASSIUM SERPL-SCNC: 3.8 MMOL/L — SIGNIFICANT CHANGE UP (ref 3.5–5.3)
PROT SERPL-MCNC: 7.2 G/DL — SIGNIFICANT CHANGE UP (ref 6–8.3)
PROTHROM AB SERPL-ACNC: 14 SEC — HIGH (ref 9.9–13.4)
RBC # BLD: 4.18 M/UL — SIGNIFICANT CHANGE UP (ref 3.8–5.2)
RBC # FLD: 12.7 % — SIGNIFICANT CHANGE UP (ref 10.3–14.5)
S PNEUM AG UR QL: NEGATIVE — SIGNIFICANT CHANGE UP
SODIUM SERPL-SCNC: 135 MMOL/L — SIGNIFICANT CHANGE UP (ref 135–145)
SPECIMEN SOURCE: SIGNIFICANT CHANGE UP
WBC # BLD: 9.11 K/UL — SIGNIFICANT CHANGE UP (ref 3.8–10.5)
WBC # FLD AUTO: 9.11 K/UL — SIGNIFICANT CHANGE UP (ref 3.8–10.5)

## 2024-12-01 PROCEDURE — 99232 SBSQ HOSP IP/OBS MODERATE 35: CPT

## 2024-12-01 PROCEDURE — 99231 SBSQ HOSP IP/OBS SF/LOW 25: CPT | Mod: GC

## 2024-12-01 RX ORDER — ACETAMINOPHEN 500MG 500 MG/1
975 TABLET, COATED ORAL EVERY 6 HOURS
Refills: 0 | Status: DISCONTINUED | OUTPATIENT
Start: 2024-12-01 | End: 2024-12-09

## 2024-12-01 RX ORDER — IPRATROPIUM BROMIDE AND ALBUTEROL SULFATE 2.5; .5 MG/3ML; MG/3ML
3 SOLUTION RESPIRATORY (INHALATION) ONCE
Refills: 0 | Status: COMPLETED | OUTPATIENT
Start: 2024-12-01 | End: 2024-12-01

## 2024-12-01 RX ORDER — 0.9 % SODIUM CHLORIDE 0.9 %
500 INTRAVENOUS SOLUTION INTRAVENOUS
Refills: 0 | Status: DISCONTINUED | OUTPATIENT
Start: 2024-12-01 | End: 2024-12-02

## 2024-12-01 RX ORDER — KETOROLAC TROMETHAMINE 30 MG/ML
15 INJECTION INTRAMUSCULAR; INTRAVENOUS ONCE
Refills: 0 | Status: DISCONTINUED | OUTPATIENT
Start: 2024-12-01 | End: 2024-12-01

## 2024-12-01 RX ORDER — 0.9 % SODIUM CHLORIDE 0.9 %
1000 INTRAVENOUS SOLUTION INTRAVENOUS
Refills: 0 | Status: DISCONTINUED | OUTPATIENT
Start: 2024-12-01 | End: 2024-12-02

## 2024-12-01 RX ORDER — ACETAMINOPHEN 500MG 500 MG/1
1000 TABLET, COATED ORAL EVERY 6 HOURS
Refills: 0 | Status: DISCONTINUED | OUTPATIENT
Start: 2024-12-01 | End: 2024-12-01

## 2024-12-01 RX ORDER — PIPERACILLIN SODIUM AND TAZOBACTAM SODIUM 4; .5 G/20ML; G/20ML
3.38 INJECTION, POWDER, LYOPHILIZED, FOR SOLUTION INTRAVENOUS ONCE
Refills: 0 | Status: COMPLETED | OUTPATIENT
Start: 2024-12-01 | End: 2024-12-01

## 2024-12-01 RX ORDER — PIPERACILLIN SODIUM AND TAZOBACTAM SODIUM 4; .5 G/20ML; G/20ML
3.38 INJECTION, POWDER, LYOPHILIZED, FOR SOLUTION INTRAVENOUS EVERY 8 HOURS
Refills: 0 | Status: DISCONTINUED | OUTPATIENT
Start: 2024-12-02 | End: 2024-12-02

## 2024-12-01 RX ORDER — SODIUM CHLORIDE 9 MG/ML
4 INJECTION, SOLUTION INTRAMUSCULAR; INTRAVENOUS; SUBCUTANEOUS EVERY 12 HOURS
Refills: 0 | Status: DISCONTINUED | OUTPATIENT
Start: 2024-12-01 | End: 2024-12-05

## 2024-12-01 RX ORDER — SODIUM CHLORIDE 9 MG/ML
500 INJECTION, SOLUTION INTRAMUSCULAR; INTRAVENOUS; SUBCUTANEOUS
Refills: 0 | Status: DISCONTINUED | OUTPATIENT
Start: 2024-12-01 | End: 2024-12-01

## 2024-12-01 RX ORDER — IPRATROPIUM BROMIDE AND ALBUTEROL SULFATE 2.5; .5 MG/3ML; MG/3ML
3 SOLUTION RESPIRATORY (INHALATION) EVERY 6 HOURS
Refills: 0 | Status: COMPLETED | OUTPATIENT
Start: 2024-12-01 | End: 2024-12-04

## 2024-12-01 RX ORDER — PIPERACILLIN SODIUM AND TAZOBACTAM SODIUM 4; .5 G/20ML; G/20ML
3.38 INJECTION, POWDER, LYOPHILIZED, FOR SOLUTION INTRAVENOUS ONCE
Refills: 0 | Status: COMPLETED | OUTPATIENT
Start: 2024-12-01 | End: 2024-12-02

## 2024-12-01 RX ADMIN — ACETAMINOPHEN 500MG 650 MILLIGRAM(S): 500 TABLET, COATED ORAL at 13:35

## 2024-12-01 RX ADMIN — SODIUM CHLORIDE 4 MILLILITER(S): 9 INJECTION, SOLUTION INTRAMUSCULAR; INTRAVENOUS; SUBCUTANEOUS at 20:35

## 2024-12-01 RX ADMIN — IPRATROPIUM BROMIDE AND ALBUTEROL SULFATE 3 MILLILITER(S): 2.5; .5 SOLUTION RESPIRATORY (INHALATION) at 15:01

## 2024-12-01 RX ADMIN — DOXYCYCLINE HYCLATE 100 MILLIGRAM(S): 150 TABLET, COATED ORAL at 01:48

## 2024-12-01 RX ADMIN — Medication 200 MILLIGRAM(S): at 12:35

## 2024-12-01 RX ADMIN — CHLORHEXIDINE GLUCONATE 1 APPLICATION(S): 1.2 RINSE ORAL at 12:45

## 2024-12-01 RX ADMIN — BENZONATATE 100 MILLIGRAM(S): 100 CAPSULE ORAL at 12:36

## 2024-12-01 RX ADMIN — Medication 200 MILLIGRAM(S): at 01:21

## 2024-12-01 RX ADMIN — BENZONATATE 100 MILLIGRAM(S): 100 CAPSULE ORAL at 07:38

## 2024-12-01 RX ADMIN — KETOROLAC TROMETHAMINE 15 MILLIGRAM(S): 30 INJECTION INTRAMUSCULAR; INTRAVENOUS at 19:50

## 2024-12-01 RX ADMIN — Medication 200 MILLIGRAM(S): at 17:10

## 2024-12-01 RX ADMIN — PIPERACILLIN SODIUM AND TAZOBACTAM SODIUM 200 GRAM(S): 4; .5 INJECTION, POWDER, LYOPHILIZED, FOR SOLUTION INTRAVENOUS at 19:52

## 2024-12-01 RX ADMIN — ACETAMINOPHEN 500MG 650 MILLIGRAM(S): 500 TABLET, COATED ORAL at 12:35

## 2024-12-01 RX ADMIN — SODIUM CHLORIDE 4 MILLILITER(S): 9 INJECTION, SOLUTION INTRAMUSCULAR; INTRAVENOUS; SUBCUTANEOUS at 15:01

## 2024-12-01 RX ADMIN — Medication 75 MILLILITER(S): at 07:35

## 2024-12-01 RX ADMIN — ACETAMINOPHEN 500MG 650 MILLIGRAM(S): 500 TABLET, COATED ORAL at 18:42

## 2024-12-01 RX ADMIN — IPRATROPIUM BROMIDE AND ALBUTEROL SULFATE 3 MILLILITER(S): 2.5; .5 SOLUTION RESPIRATORY (INHALATION) at 20:35

## 2024-12-01 RX ADMIN — IPRATROPIUM BROMIDE AND ALBUTEROL SULFATE 3 MILLILITER(S): 2.5; .5 SOLUTION RESPIRATORY (INHALATION) at 19:35

## 2024-12-01 RX ADMIN — Medication 200 MILLIGRAM(S): at 07:37

## 2024-12-01 RX ADMIN — Medication 75 MILLILITER(S): at 14:20

## 2024-12-01 RX ADMIN — ACETAMINOPHEN 500MG 650 MILLIGRAM(S): 500 TABLET, COATED ORAL at 01:48

## 2024-12-01 NOTE — RAPID RESPONSE TEAM SUMMARY - NSSITUATIONBACKGROUNDRRT_GEN_ALL_CORE
Pt is a 24 F w/ hx ovarian cyst presents to ED complaining of shortness of breath, fever x1 week. Pt admitted for sepsis w/ acute hypoxic respiratory failure 2/2 multifocal pna. RRT called for patient tachypnea and AMS. On arrival, /95, , RR 40, T 100.7F oral, O2 sat 93% on 2L NC. Patient drowsy but aox3. . Rectal temp 103.2. Full set of labs and cultures drawn Patient received tylenol PO w/i the hour, so 15mg toradol ordered and abx broadened to Zosyn. Patient also placed on icepacks, turned to right side for better perfusion of less injured lung, and given dose of duonebs. MICU consulted as patient with persistently high RR. MICU assessed bedside, recommended HFNC at 60L/m to assist with aeration and ease wob. MICU to reassess patient in an hour to assess effects of intervention. Patient otherwise HDS. Primary team instructed to apply cooling blanket if patient persistently febrile and to assess for changes in RR and AMS. Primary team to f/u labs. rapid ended.

## 2024-12-01 NOTE — CONSULT NOTE ADULT - SUBJECTIVE AND OBJECTIVE BOX
CHIEF COMPLAINT:    HPI:  Pt is a 24 F w/ hx ovarian cyst presents to ED complaining of shortness of breath, cough and fever x1 week. Pt states she went to Urgent care and pcp and was prescribed azithromycin. She states she took 3 days of it however symptoms did not improve. She states she continues to have fever and cough. She reports chest pain w/ cough. Denies abd pain, n/v/d, or urinary symptoms. She denies any sick contacts or recent travel.     In ED, vitals T101.1, , /86, RR 20, O2 sat 89% on RA --> 95% on 2L NC  Labs significant for: ast/alt 81/96  EKG personally reviewed: sinus tachycardia, no acute ischemic changes   CXR: prelim read: LLL pna  ED management: IV ceftriaxone, IV doxy, IV tylenol, 1 L NS    Interval:   RRT called for tachypnea. Refer to RRT. MICU consulted for recs.       PAST MEDICAL & SURGICAL HISTORY:  No pertinent past medical history      No significant past surgical history          FAMILY HISTORY:      SOCIAL HISTORY:  Smoking: __ packs x ___ years  EtOH Use:  Marital Status:    Allergies    No Known Allergies    Intolerances        HOME MEDICATIONS:    REVIEW OF SYSTEMS:    CONSTITUTIONAL: + fevers, chills   EYES/ENT: No visual changes;  No vertigo or throat pain   NECK: No pain or stiffness  RESPIRATORY: No cough, wheezing, hemoptysis; + SOB   CARDIOVASCULAR: No chest pain or palpitations  GASTROINTESTINAL: No abdominal or epigastric pain. No nausea, vomiting, or hematemesis; No diarrhea or constipation. No melena or hematochezia.  GENITOURINARY: No dysuria, frequency or hematuria  NEUROLOGICAL: No numbness or weakness  SKIN: No itching, rashes      OBJECTIVE:  ICU Vital Signs Last 24 Hrs  T(C): 37.8 (01 Dec 2024 18:40), Max: 38 (01 Dec 2024 01:44)  T(F): 100 (01 Dec 2024 18:40), Max: 100.4 (01 Dec 2024 01:44)  HR: 88 (01 Dec 2024 15:01) (88 - 113)  BP: 123/77 (01 Dec 2024 14:11) (123/77 - 136/96)  BP(mean): --  ABP: --  ABP(mean): --  RR: 18 (01 Dec 2024 14:11) (18 - 18)  SpO2: 100% (01 Dec 2024 15:01) (98% - 100%)    O2 Parameters below as of 01 Dec 2024 15:01  Patient On (Oxygen Delivery Method): nasal cannula              11-30 @ 07:01  -  12-01 @ 07:00  --------------------------------------------------------  IN: 1000 mL / OUT: 0 mL / NET: 1000 mL      CAPILLARY BLOOD GLUCOSE      POCT Blood Glucose.: 115 mg/dL (01 Dec 2024 19:31)      VITALS:   T(C): 37.8 (12-01-24 @ 18:40), Max: 38 (12-01-24 @ 01:44)  HR: 88 (12-01-24 @ 15:01) (88 - 113)  BP: 123/77 (12-01-24 @ 14:11) (123/77 - 136/96)  RR: 18 (12-01-24 @ 14:11) (18 - 18)  SpO2: 100% (12-01-24 @ 15:01) (98% - 100%)    GENERAL: NAD, lying in bed comfortably  HEAD:  Atraumatic, normocephalic  EYES:  conjunctiva and sclera clear  ENT: Moist mucous membranes  NECK: Supple, no JVD  HEART: Regular rate and rhythm, no murmurs, rubs, or gallops  LUNGS: Unlabored respirations.  Clear to auscultation bilaterally   ABDOMEN: Soft, nontender, nondistended, +BS  EXTREMITIES: No clubbing, cyanosis, or edema  NERVOUS SYSTEM:  A&Ox3, no focal deficits   SKIN: No rashes or lesions    HOSPITAL MEDICATIONS:  MEDICATIONS  (STANDING):  albuterol/ipratropium for Nebulization 3 milliLiter(s) Nebulizer every 6 hours  benzonatate 100 milliGRAM(s) Oral every 8 hours  chlorhexidine 2% Cloths 1 Application(s) Topical daily  guaiFENesin Oral Liquid (Sugar-Free) 200 milliGRAM(s) Oral every 6 hours  lactated ringers. 1000 milliLiter(s) (75 mL/Hr) IV Continuous <Continuous>  lactated ringers. 500 milliLiter(s) (75 mL/Hr) IV Continuous <Continuous>  piperacillin/tazobactam IVPB.- 3.375 Gram(s) IV Intermittent once  sodium chloride 3%  Inhalation 4 milliLiter(s) Inhalation every 12 hours    MEDICATIONS  (PRN):  acetaminophen     Tablet .. 650 milliGRAM(s) Oral every 6 hours PRN Temp greater or equal to 38C (100.4F), Mild Pain (1 - 3)      LABS:                        12.6   9.52  )-----------( 134      ( 30 Nov 2024 11:30 )             38.3     12-01    135  |  100  |  6[L]  ----------------------------<  113[H]  3.8   |  22  |  0.45[L]    Ca    8.7      01 Dec 2024 19:52  Phos  2.6     12-01  Mg     2.00     12-01    TPro  7.2  /  Alb  3.0[L]  /  TBili  0.3  /  DBili  x   /  AST  127[H]  /  ALT  141[H]  /  AlkPhos  54  12-01    PT/INR - ( 01 Dec 2024 19:40 )   PT: 14.0 sec;   INR: 1.21 ratio         PTT - ( 01 Dec 2024 19:40 )  PTT:29.3 sec  Urinalysis Basic - ( 01 Dec 2024 19:52 )    Color: x / Appearance: x / SG: x / pH: x  Gluc: 113 mg/dL / Ketone: x  / Bili: x / Urobili: x   Blood: x / Protein: x / Nitrite: x   Leuk Esterase: x / RBC: x / WBC x   Sq Epi: x / Non Sq Epi: x / Bacteria: x        Venous Blood Gas:  12-01 @ 19:52  7.44/36/69/24/94.1  VBG Lactate: 1.5      MICROBIOLOGY:     RADIOLOGY:  [ ] Reviewed and interpreted by me    EKG:

## 2024-12-01 NOTE — CHART NOTE - NSCHARTNOTEFT_GEN_A_CORE
ACP MEDICINE COVERAGE - Medicine Subsequent Hospital Care Note    CC:  Tachypnea 2/2 sepsis PNA  HPI/Subjective: Pt is a 24 F w/ hx ovarian cyst presents to ED complaining of shortness of breath, fever x1 week. Pt admitted for sepsis w/ acute hypoxic respiratory failure 2/2 multifocal pna.    RN notified ACP for concern of patient's increased WOB, AMS, increased RR and HR stating that she was not looking well.  I recommended for the RN to call a RRT.  Examined at bedside with RRT already present.  On arrival. /95, , RR 40, T 100.7F oral, O2 sat 93% on 2L NC. Patient drowsy but aox3. . Rectal temp 103.2.  Patient denied chest pain.    ]x I spoke with the nurse, and family to obtain the history.  [  ] Unable to obtain history due to ___________.    Vital Signs Last 24 Hrs  T(C): 37.8 (12-01-24 @ 18:40), Max: 38 (12-01-24 @ 01:44)  T(F): 100 (12-01-24 @ 18:40), Max: 100.4 (12-01-24 @ 01:44)  HR: 88 (12-01-24 @ 15:01) (88 - 113)  BP: 123/77 (12-01-24 @ 14:11) (123/77 - 136/96)  RR: 18 (12-01-24 @ 14:11) (18 - 18)  SpO2: 100% (12-01-24 @ 15:01) (98% - 100%) on (O2)    PHYSICAL EXAM:  Constitutional: NAD, well-developed, well-nourished  Respiratory: Tachypnea with increased WOB, Decreased air entry b/l L>Rt, left lung crackle  Cardiovascular: Tachycardia, S1 and S2,   Psychiatric: A&Ox3, anxious    LABS:                        12.6   9.52  )-----------( 134      ( 30 Nov 2024 11:30 )             38.3     12-01    135  |  100  |  6[L]  ----------------------------<  113[H]  3.8   |  22  |  0.45[L]    Ca    8.7      01 Dec 2024 19:52  Phos  2.6     12-01  Mg     2.00     12-01    TPro  7.2  /  Alb  3.0[L]  /  TBili  0.3  /  DBili  x   /  AST  127[H]  /  ALT  141[H]  /  AlkPhos  54  12-01      CARDIAC ENZYMES            Serum Pro-Brain Natriuretic Peptide:   D-Dimer Assay:     Urinanalysis Basic (12-01-24 @ 20:45):  Color: -- / Appearance: -- / SG: -- / pH: --  Gluc: 113 / Ketone: -- / Bili: -- / Urobili: --  Blood: -- / Protein: -- / Nitrite: --  Leuk Esterase: -- / RBC: -- / WBC: --  Sq Epi: -- / Non Sq Epi: -- / Bacteria: --      Blood Gas Venous (12-01-24 @ 20:45):  pH: 7.44 | HCO3: 24 | pCO2: 36 | pO2: 69 | Lactate: 1.5  pH: 7.44 | HCO3: 25 | pCO2: 37 | pO2: 37 | Lactate: 1.5      Blood Gas Arterial (12-01-24 @ 20:45):      CAPILLARY BLOOD GLUCOSE:  POCT Blood Glucose: 115 mg/dL (12-01-24 @ 19:31)      COVID PCR:      RADIOLOGY:    MEDICATIONS  (STANDING):  albuterol/ipratropium for Nebulization 3 milliLiter(s) Nebulizer every 6 hours  benzonatate 100 milliGRAM(s) Oral every 8 hours  chlorhexidine 2% Cloths 1 Application(s) Topical daily  guaiFENesin Oral Liquid (Sugar-Free) 200 milliGRAM(s) Oral every 6 hours  lactated ringers. 1000 milliLiter(s) (75 mL/Hr) IV Continuous <Continuous>  lactated ringers. 500 milliLiter(s) (75 mL/Hr) IV Continuous <Continuous>  piperacillin/tazobactam IVPB.- 3.375 Gram(s) IV Intermittent once  sodium chloride 3%  Inhalation 4 milliLiter(s) Inhalation every 12 hours    MEDICATIONS  (PRN):  acetaminophen     Tablet .. 650 milliGRAM(s) Oral every 6 hours PRN Temp greater or equal to 38C (100.4F), Mild Pain (1 - 3)    I&O's Summary    30 Nov 2024 07:01  -  01 Dec 2024 07:00  --------------------------------------------------------  IN: 1000 mL / OUT: 0 mL / NET: 1000 mL      I reviewed the above labs, radiology, medications, tests, telemetry, and EKG interpretation.    ASSESSMENT/PLAN:    Sepsis - tachypnea, tachycardia, fever  -CBC, BMP, BVG  -Ice packs and cooling blanket to reduce fever.  -Toradol 15mg IV  -Increase Tylenonl to 1g  -Zosyn 3.375g IV  -Q4 Vitals  -MICU consulted at bedside. Recommend High Flow O2 therapy.  Will follow up.  -Family at bedside and updated.  -Will continue to monitor.     Low complexity/risk (30min)                     RRT called for patient tachypnea and AMS. On arrival, /95, , RR 40, T 100.7F oral, O2 sat 93% on 2L NC. Patient drowsy but aox3. . Rectal temp 103.2. Full set of labs and cultures drawn Patient received tylenol PO w/i the hour, so 15mg toradol ordered and abx broadened to Zosyn. Patient also placed on icepacks, turned to right side for better perfusion of less injured lung, and given dose of duonebs. MICU consulted as patient with persistently high RR. MICU assessed bedside, recommended HFNC at 60L/m to assist with aeration and ease wob. MICU to reassess patient in an hour to assess effects of intervention. Patient otherwise HDS. Primary team instructed to apply cooling blanket if patient persistently febrile and to assess for changes in RR and AMS. Primary team to f/u labs. rapid ended.  NIPPV/CPAP  Toradol 15mg IV  Zosyn 3.375g  Remained on unit [facility] MEDICINE COVERAGE - Medicine Subsequent Hospital Care Note    CC:  Tachypnea 2/2 sepsis PNA  HPI/Subjective: Pt is a 24 F w/ hx ovarian cyst presents to ED complaining of shortness of breath, fever x1 week. Pt admitted for sepsis w/ acute hypoxic respiratory failure 2/2 multifocal pna.    RN notified ACP for concern of patient's increased WOB, AMS, increased RR and HR stating that she was not looking well.  I recommended for the RN to call a RRT.  Examined at bedside with RRT already present.  On arrival. /95, , RR 40, T 100.7F oral, O2 sat 93% on 2L NC. Patient drowsy but aox3. . Rectal temp 103.2.  Patient denied chest pain.    ]x I spoke with the nurse, and family to obtain the history.  [  ] Unable to obtain history due to ___________.    Vital Signs Last 24 Hrs  T(C): 37.8 (12-01-24 @ 18:40), Max: 38 (12-01-24 @ 01:44)  T(F): 100 (12-01-24 @ 18:40), Max: 100.4 (12-01-24 @ 01:44)  HR: 88 (12-01-24 @ 15:01) (88 - 113)  BP: 123/77 (12-01-24 @ 14:11) (123/77 - 136/96)  RR: 18 (12-01-24 @ 14:11) (18 - 18)  SpO2: 100% (12-01-24 @ 15:01) (98% - 100%) on (O2)    PHYSICAL EXAM:  Constitutional: NAD, well-developed, well-nourished  Respiratory: Tachypnea with increased WOB, Decreased air entry b/l L>Rt, left lung crackle  Cardiovascular: Tachycardia, S1 and S2,   Psychiatric: A&Ox3, anxious    LABS:                        12.6   9.52  )-----------( 134      ( 30 Nov 2024 11:30 )             38.3     12-01    135  |  100  |  6[L]  ----------------------------<  113[H]  3.8   |  22  |  0.45[L]    Ca    8.7      01 Dec 2024 19:52  Phos  2.6     12-01  Mg     2.00     12-01    TPro  7.2  /  Alb  3.0[L]  /  TBili  0.3  /  DBili  x   /  AST  127[H]  /  ALT  141[H]  /  AlkPhos  54  12-01      CARDIAC ENZYMES            Serum Pro-Brain Natriuretic Peptide:   D-Dimer Assay:     Urinanalysis Basic (12-01-24 @ 20:45):  Color: -- / Appearance: -- / SG: -- / pH: --  Gluc: 113 / Ketone: -- / Bili: -- / Urobili: --  Blood: -- / Protein: -- / Nitrite: --  Leuk Esterase: -- / RBC: -- / WBC: --  Sq Epi: -- / Non Sq Epi: -- / Bacteria: --      Blood Gas Venous (12-01-24 @ 20:45):  pH: 7.44 | HCO3: 24 | pCO2: 36 | pO2: 69 | Lactate: 1.5  pH: 7.44 | HCO3: 25 | pCO2: 37 | pO2: 37 | Lactate: 1.5      Blood Gas Arterial (12-01-24 @ 20:45):      CAPILLARY BLOOD GLUCOSE:  POCT Blood Glucose: 115 mg/dL (12-01-24 @ 19:31)      COVID PCR:      RADIOLOGY:    MEDICATIONS  (STANDING):  albuterol/ipratropium for Nebulization 3 milliLiter(s) Nebulizer every 6 hours  benzonatate 100 milliGRAM(s) Oral every 8 hours  chlorhexidine 2% Cloths 1 Application(s) Topical daily  guaiFENesin Oral Liquid (Sugar-Free) 200 milliGRAM(s) Oral every 6 hours  lactated ringers. 1000 milliLiter(s) (75 mL/Hr) IV Continuous <Continuous>  lactated ringers. 500 milliLiter(s) (75 mL/Hr) IV Continuous <Continuous>  piperacillin/tazobactam IVPB.- 3.375 Gram(s) IV Intermittent once  sodium chloride 3%  Inhalation 4 milliLiter(s) Inhalation every 12 hours    MEDICATIONS  (PRN):  acetaminophen     Tablet .. 650 milliGRAM(s) Oral every 6 hours PRN Temp greater or equal to 38C (100.4F), Mild Pain (1 - 3)    I&O's Summary    30 Nov 2024 07:01  -  01 Dec 2024 07:00  --------------------------------------------------------  IN: 1000 mL / OUT: 0 mL / NET: 1000 mL      I reviewed the above labs, radiology, medications, tests, telemetry, and EKG interpretation.    ASSESSMENT/PLAN:    Sepsis - tachypnea, tachycardia, fever  -CBC, BMP, BVG  -Ice packs and cooling blanket to reduce fever.  -Toradol 15mg IV  -Increase Tylenonl to 1g  -Zosyn 3.375g IV  -Q4 Vitals  -MICU consulted at bedside. Recommend High Flow O2 therapy.  Will follow up.  -Family at bedside and updated.  -Will continue to monitor.     Low complexity/risk (30min) [facility] MEDICINE COVERAGE - Medicine Subsequent Hospital Care Note    CC:  Tachypnea 2/2 sepsis PNA  HPI/Subjective: Pt is a 24 F w/ hx ovarian cyst presents to ED complaining of shortness of breath, fever x1 week. Pt admitted for sepsis w/ acute hypoxic respiratory failure 2/2 multifocal pna.    RN notified ACP for concern of patient's increased WOB, AMS, increased RR and HR stating that she was not looking well.  I recommended for the RN to call a RRT.  Examined at bedside with RRT already present.  On arrival. /95, , RR 40, T 100.7F oral, O2 sat 93% on 2L NC. Patient drowsy but aox3. . Rectal temp 103.2.  Patient denied chest pain.    ]x I spoke with the nurse, and family to obtain the history.  [  ] Unable to obtain history due to ___________.    Vital Signs:  T(F): 103.2 rectally  HR: 111   BP: 143/95  RR: 40  SpO2: 93% on 2L NC    PHYSICAL EXAM:  Constitutional: NAD, well-developed, well-nourished  Respiratory: Tachypnea with increased WOB, Decreased air entry b/l L>Rt, left lung crackle  Cardiovascular: Tachycardia, S1 and S2,   Psychiatric: A&Ox3, anxious    LABS:                        12.6   9.52  )-----------( 134      ( 30 Nov 2024 11:30 )             38.3     12-01    135  |  100  |  6[L]  ----------------------------<  113[H]  3.8   |  22  |  0.45[L]    Ca    8.7      01 Dec 2024 19:52  Phos  2.6     12-01  Mg     2.00     12-01    TPro  7.2  /  Alb  3.0[L]  /  TBili  0.3  /  DBili  x   /  AST  127[H]  /  ALT  141[H]  /  AlkPhos  54  12-01      CARDIAC ENZYMES            Serum Pro-Brain Natriuretic Peptide:   D-Dimer Assay:     Urinanalysis Basic (12-01-24 @ 20:45):  Color: -- / Appearance: -- / SG: -- / pH: --  Gluc: 113 / Ketone: -- / Bili: -- / Urobili: --  Blood: -- / Protein: -- / Nitrite: --  Leuk Esterase: -- / RBC: -- / WBC: --  Sq Epi: -- / Non Sq Epi: -- / Bacteria: --      Blood Gas Venous (12-01-24 @ 20:45):  pH: 7.44 | HCO3: 24 | pCO2: 36 | pO2: 69 | Lactate: 1.5  pH: 7.44 | HCO3: 25 | pCO2: 37 | pO2: 37 | Lactate: 1.5      Blood Gas Arterial (12-01-24 @ 20:45):      CAPILLARY BLOOD GLUCOSE:  POCT Blood Glucose: 115 mg/dL (12-01-24 @ 19:31)      COVID PCR:      RADIOLOGY:    MEDICATIONS  (STANDING):  albuterol/ipratropium for Nebulization 3 milliLiter(s) Nebulizer every 6 hours  benzonatate 100 milliGRAM(s) Oral every 8 hours  chlorhexidine 2% Cloths 1 Application(s) Topical daily  guaiFENesin Oral Liquid (Sugar-Free) 200 milliGRAM(s) Oral every 6 hours  lactated ringers. 1000 milliLiter(s) (75 mL/Hr) IV Continuous <Continuous>  lactated ringers. 500 milliLiter(s) (75 mL/Hr) IV Continuous <Continuous>  piperacillin/tazobactam IVPB.- 3.375 Gram(s) IV Intermittent once  sodium chloride 3%  Inhalation 4 milliLiter(s) Inhalation every 12 hours    MEDICATIONS  (PRN):  acetaminophen     Tablet .. 650 milliGRAM(s) Oral every 6 hours PRN Temp greater or equal to 38C (100.4F), Mild Pain (1 - 3)    I&O's Summary    30 Nov 2024 07:01  -  01 Dec 2024 07:00  --------------------------------------------------------  IN: 1000 mL / OUT: 0 mL / NET: 1000 mL      I reviewed the above labs, radiology, medications, tests, telemetry, and EKG interpretation.    ASSESSMENT/PLAN:    Sepsis - tachypnea, tachycardia, fever  -CBC, BMP, BVG  -Ice packs and cooling blanket to reduce fever.  -Toradol 15mg IV  -Increase Tylenonl to 1g  -Zosyn 3.375g IV  -Q4 Vitals  -MICU consulted at bedside. Recommend High Flow O2 therapy.  Will follow up.  -Family at bedside and updated.  -Will continue to monitor.     _____________________________________________________________________________________________________________________________________________________  ADDENDUM:  -Patient reevaluated at bed side.  Pt resting comfortably with an improved respiratory rate and decreased WOB.     -MICU recommendations ordered.    Vital Signs Last 24 Hrs  T(F): 98.1 Orally   HR: 84  BP: 128/70  RR: 30  SpO2: 100% on High Flow NC    Blood Gas Venous   pH: 7.44 | HCO3: 24 | pCO2: 36 | pO2: 69 | Lactate: 1.5

## 2024-12-01 NOTE — CONSULT NOTE ADULT - TIME-BASED BILLING (NON-CRITICAL CARE)
Category 1 fetal tracing. Discharge instructions reviewed with patient, understanding verbalized. Patient to call with any questions or concerns. Patient discharged ambulatory with .   
Time-based billing (NON-critical care)

## 2024-12-01 NOTE — CONSULT NOTE ADULT - ASSESSMENT
24 F w/ hx ovarian cyst presents to ED complaining of shortness of breath, fever x1 week. Pt admitted for sepsis w/ acute hypoxic respiratory failure 2/2 multifocal pna. RRT called for patient tachypnea and AMS. MICU consulted for further recs.     #Acute hypoxic respiratory failure   #Multifocal PNA   #Increased WOB   On exam, pt satting well on NC, tachypneic RR 20's    Recommendations:   - Broad spectrum IV abx, infectious w/u  - Trend fever curve, WBC   - Transition to HFNC 60/60 to assist w/ aeration and increased WOB   - Please repeat blood gas on HFNC     Pt is currently not a MICU candidate at this time. Please reconsult as needed.   Case discussed w/ Dr. Mckeon     *Note incomplete until attending attestation*     Verenice Morfin MD   Internal Medicine, PGY-3

## 2024-12-01 NOTE — PROGRESS NOTE ADULT - SUBJECTIVE AND OBJECTIVE BOX
Dr. Jenelle Rai  Pager [phone]    PROGRESS NOTE:     Patient is a 24y old  Female who presents with a chief complaint of pna (30 Nov 2024 13:04)      SUBJECTIVE / OVERNIGHT EVENTS: denies chest pain , breathing a little better, still dypneic  ADDITIONAL REVIEW OF SYSTEMS: afebrile     MEDICATIONS  (STANDING):  albuterol/ipratropium for Nebulization 3 milliLiter(s) Nebulizer every 6 hours  benzonatate 100 milliGRAM(s) Oral every 8 hours  cefTRIAXone   IVPB 1000 milliGRAM(s) IV Intermittent every 24 hours  chlorhexidine 2% Cloths 1 Application(s) Topical daily  guaiFENesin Oral Liquid (Sugar-Free) 200 milliGRAM(s) Oral every 6 hours  lactated ringers. 1000 milliLiter(s) (75 mL/Hr) IV Continuous <Continuous>  sodium chloride 3%  Inhalation 4 milliLiter(s) Inhalation every 12 hours    MEDICATIONS  (PRN):  acetaminophen     Tablet .. 650 milliGRAM(s) Oral every 6 hours PRN Temp greater or equal to 38C (100.4F), Mild Pain (1 - 3)      CAPILLARY BLOOD GLUCOSE        I&O's Summary    30 Nov 2024 07:01  -  01 Dec 2024 07:00  --------------------------------------------------------  IN: 1000 mL / OUT: 0 mL / NET: 1000 mL        PHYSICAL EXAM:  Vital Signs Last 24 Hrs  T(C): 36.9 (01 Dec 2024 11:45), Max: 38 (01 Dec 2024 01:44)  T(F): 98.4 (01 Dec 2024 11:45), Max: 100.4 (01 Dec 2024 01:44)  HR: 97 (01 Dec 2024 11:45) (96 - 113)  BP: 136/96 (01 Dec 2024 11:45) (132/78 - 138/88)  BP(mean): --  RR: 18 (01 Dec 2024 11:45) (16 - 18)  SpO2: 98% (01 Dec 2024 11:45) (96% - 100%)    Parameters below as of 01 Dec 2024 06:30  Patient On (Oxygen Delivery Method): nasal cannula  O2 Flow (L/min): 6  CONSTITUTIONAL: nad  RESPIRATORY: decreased air entry b/l L>Rt, left lung crackle   CARDIOVASCULAR: Regular rate and rhythm, normal S1 and S2, no murmur/rub/gallop; No lower extremity edema; Peripheral pulses are 2+ bilaterally  ABDOMEN: Nontender to palpation, normoactive bowel sounds, no rebound/guarding; No hepatosplenomegaly  MUSCULOSKELETAL: no clubbing or cyanosis of digits; no joint swelling or tenderness to palpation  PSYCH: A+O to person, place, and time; affect appropriate    LABS:                        12.6   9.52  )-----------( 134      ( 30 Nov 2024 11:30 )             38.3     11-30    137  |  100  |  6[L]  ----------------------------<  95  3.8   |  22  |  0.46[L]    Ca    8.7      30 Nov 2024 11:30  Phos  2.5     11-30  Mg     2.00     11-30    TPro  7.6  /  Alb  3.3  /  TBili  0.3  /  DBili  x   /  AST  98[H]  /  ALT  108[H]  /  AlkPhos  57  11-30          Urinalysis Basic - ( 30 Nov 2024 11:30 )    Color: x / Appearance: x / SG: x / pH: x  Gluc: 95 mg/dL / Ketone: x  / Bili: x / Urobili: x   Blood: x / Protein: x / Nitrite: x   Leuk Esterase: x / RBC: x / WBC x   Sq Epi: x / Non Sq Epi: x / Bacteria: x        Culture - Sputum (collected 29 Nov 2024 20:00)  Source: .Sputum Sputum  Gram Stain (30 Nov 2024 06:41):    Rare polymorphonuclear leukocytes per low power field    Rare Squamous epithelial cells per low power field    Few-moderate Gram Negative Rods seen per oil power field    Few-moderate Gram positive cocci in pairs seen per oil power field    Few Gram Positive Rods seen per oil power field  Final Report (01 Dec 2024 08:58):    Commensal jj consistent with body site    Urinalysis with Rflx Culture (collected 29 Nov 2024 11:40)    Culture - Blood (collected 29 Nov 2024 06:55)  Source: .Blood BLOOD  Preliminary Report (01 Dec 2024 09:00):    No growth at 48 Hours    Culture - Blood (collected 29 Nov 2024 01:10)  Source: .Blood BLOOD  Preliminary Report (01 Dec 2024 09:00):    No growth at 48 Hours        RADIOLOGY & ADDITIONAL TESTS:  Results Reviewed:   Imaging Personally Reviewed:  Electrocardiogram Personally Reviewed:    COORDINATION OF CARE:  Care Discussed with Consultants/Other Providers [Y/N]:  Prior or Outpatient Records Reviewed [Y/N]:   Dr. Jenelle Rai  Pager [phone]    PROGRESS NOTE:     Patient is a 24y old  Female who presents with a chief complaint of pna (30 Nov 2024 13:04)      SUBJECTIVE / OVERNIGHT EVENTS: denies chest pain , breathing a little better, still dypneic  ADDITIONAL REVIEW OF SYSTEMS: afebrile     MEDICATIONS  (STANDING):  albuterol/ipratropium for Nebulization 3 milliLiter(s) Nebulizer every 6 hours  benzonatate 100 milliGRAM(s) Oral every 8 hours  cefTRIAXone   IVPB 1000 milliGRAM(s) IV Intermittent every 24 hours  chlorhexidine 2% Cloths 1 Application(s) Topical daily  guaiFENesin Oral Liquid (Sugar-Free) 200 milliGRAM(s) Oral every 6 hours  lactated ringers. 1000 milliLiter(s) (75 mL/Hr) IV Continuous <Continuous>  sodium chloride 3%  Inhalation 4 milliLiter(s) Inhalation every 12 hours    MEDICATIONS  (PRN):  acetaminophen     Tablet .. 650 milliGRAM(s) Oral every 6 hours PRN Temp greater or equal to 38C (100.4F), Mild Pain (1 - 3)      CAPILLARY BLOOD GLUCOSE        I&O's Summary    30 Nov 2024 07:01  -  01 Dec 2024 07:00  --------------------------------------------------------  IN: 1000 mL / OUT: 0 mL / NET: 1000 mL        PHYSICAL EXAM:  Vital Signs Last 24 Hrs  T(C): 36.9 (01 Dec 2024 11:45), Max: 38 (01 Dec 2024 01:44)  T(F): 98.4 (01 Dec 2024 11:45), Max: 100.4 (01 Dec 2024 01:44)  HR: 97 (01 Dec 2024 11:45) (96 - 113)  BP: 136/96 (01 Dec 2024 11:45) (132/78 - 138/88)  BP(mean): --  RR: 18 (01 Dec 2024 11:45) (16 - 18)  SpO2: 98% (01 Dec 2024 11:45) (96% - 100%)    Parameters below as of 01 Dec 2024 06:30  Patient On (Oxygen Delivery Method): nasal cannula  O2 Flow (L/min): 6  CONSTITUTIONAL: nad  RESPIRATORY: decreased air entry b/l L>Rt, left lung crackle   CARDIOVASCULAR: Regular rate and rhythm, normal S1 and S2, no murmur/rub/gallop; No lower extremity edema; Peripheral pulses are 2+ bilaterally  ABDOMEN: Nontender to palpation, normoactive bowel sounds, no rebound/guarding; No hepatosplenomegaly  MUSCULOSKELETAL: no clubbing or cyanosis of digits; no joint swelling or tenderness to palpation  PSYCH: A+O to person, place, and time; affect appropriate    LABS:                        12.6   9.52  )-----------( 134      ( 30 Nov 2024 11:30 )             38.3     11-30    137  |  100  |  6[L]  ----------------------------<  95  3.8   |  22  |  0.46[L]    Ca    8.7      30 Nov 2024 11:30  Phos  2.5     11-30  Mg     2.00     11-30    TPro  7.6  /  Alb  3.3  /  TBili  0.3  /  DBili  x   /  AST  98[H]  /  ALT  108[H]  /  AlkPhos  57  11-30          Urinalysis Basic - ( 30 Nov 2024 11:30 )    Color: x / Appearance: x / SG: x / pH: x  Gluc: 95 mg/dL / Ketone: x  / Bili: x / Urobili: x   Blood: x / Protein: x / Nitrite: x   Leuk Esterase: x / RBC: x / WBC x   Sq Epi: x / Non Sq Epi: x / Bacteria: x        Culture - Sputum (collected 29 Nov 2024 20:00)  Source: .Sputum Sputum  Gram Stain (30 Nov 2024 06:41):    Rare polymorphonuclear leukocytes per low power field    Rare Squamous epithelial cells per low power field    Few-moderate Gram Negative Rods seen per oil power field    Few-moderate Gram positive cocci in pairs seen per oil power field    Few Gram Positive Rods seen per oil power field  Final Report (01 Dec 2024 08:58):    Commensal jj consistent with body site    Urinalysis with Rflx Culture (collected 29 Nov 2024 11:40)    Culture - Blood (collected 29 Nov 2024 06:55)  Source: .Blood BLOOD  Preliminary Report (01 Dec 2024 09:00):    No growth at 48 Hours    Culture - Blood (collected 29 Nov 2024 01:10)  Source: .Blood BLOOD  Preliminary Report (01 Dec 2024 09:00):    No growth at 48 Hours        RADIOLOGY & ADDITIONAL TESTS:  Results Reviewed:   Imaging Personally Reviewed:  < from: US Abdomen Upper Quadrant Right (11.30.24 @ 16:30) >  IMPRESSION:  Hepatic steatosis without focal abnormality.      Electrocardiogram Personally Reviewed:    COORDINATION OF CARE:  Care Discussed with Consultants/Other Providers [Y/N]:  Prior or Outpatient Records Reviewed [Y/N]:

## 2024-12-01 NOTE — PROGRESS NOTE ADULT - PROBLEM SELECTOR PLAN 1
Pt febrile, tachy, hypoxic to 89% on RA requiring 2L NC. Likely 2/2 pna. No improvement on azithromycin outpatient. s/p IV ceftriaxone and IV doxycycline in ED. PCT 0.47  - c/w IV ceftriaxone . dc doxy as urine legionella neg, strep neg  - add pulmonary toilet: chest PT, aerobika q6, incentive spirometer, HTS 3% 4ml bid  - wean O2 as tolerated, weaned from NRB to 6L NC Resident

## 2024-12-02 LAB
ADD ON TEST-SPECIMEN IN LAB: SIGNIFICANT CHANGE UP
ALBUMIN SERPL ELPH-MCNC: 2.9 G/DL — LOW (ref 3.3–5)
ALP SERPL-CCNC: 51 U/L — SIGNIFICANT CHANGE UP (ref 40–120)
ALT FLD-CCNC: 147 U/L — HIGH (ref 4–33)
ANION GAP SERPL CALC-SCNC: 10 MMOL/L — SIGNIFICANT CHANGE UP (ref 7–14)
AST SERPL-CCNC: 127 U/L — HIGH (ref 4–32)
B PERT DNA SPEC QL NAA+PROBE: SIGNIFICANT CHANGE UP
B PERT+PARAPERT DNA PNL SPEC NAA+PROBE: SIGNIFICANT CHANGE UP
BILIRUB DIRECT SERPL-MCNC: <0.2 MG/DL — SIGNIFICANT CHANGE UP (ref 0–0.3)
BILIRUB INDIRECT FLD-MCNC: >0.1 MG/DL — SIGNIFICANT CHANGE UP (ref 0–1)
BILIRUB SERPL-MCNC: 0.3 MG/DL — SIGNIFICANT CHANGE UP (ref 0.2–1.2)
BUN SERPL-MCNC: 8 MG/DL — SIGNIFICANT CHANGE UP (ref 7–23)
C PNEUM DNA SPEC QL NAA+PROBE: SIGNIFICANT CHANGE UP
CALCIUM SERPL-MCNC: 8.5 MG/DL — SIGNIFICANT CHANGE UP (ref 8.4–10.5)
CHLORIDE SERPL-SCNC: 99 MMOL/L — SIGNIFICANT CHANGE UP (ref 98–107)
CO2 SERPL-SCNC: 23 MMOL/L — SIGNIFICANT CHANGE UP (ref 22–31)
CREAT SERPL-MCNC: 0.43 MG/DL — LOW (ref 0.5–1.3)
CRP SERPL-MCNC: 117 MG/L — HIGH
EGFR: 140 ML/MIN/1.73M2 — SIGNIFICANT CHANGE UP
ERYTHROCYTE [SEDIMENTATION RATE] IN BLOOD: 70 MM/HR — HIGH (ref 4–25)
FLUAV AG NPH QL: SIGNIFICANT CHANGE UP
FLUAV SUBTYP SPEC NAA+PROBE: SIGNIFICANT CHANGE UP
FLUBV AG NPH QL: SIGNIFICANT CHANGE UP
FLUBV RNA SPEC QL NAA+PROBE: SIGNIFICANT CHANGE UP
GLUCOSE SERPL-MCNC: 135 MG/DL — HIGH (ref 70–99)
HADV DNA SPEC QL NAA+PROBE: SIGNIFICANT CHANGE UP
HAV IGM SER-ACNC: SIGNIFICANT CHANGE UP
HBV CORE IGM SER-ACNC: SIGNIFICANT CHANGE UP
HBV SURFACE AG SER-ACNC: SIGNIFICANT CHANGE UP
HCOV 229E RNA SPEC QL NAA+PROBE: SIGNIFICANT CHANGE UP
HCOV HKU1 RNA SPEC QL NAA+PROBE: SIGNIFICANT CHANGE UP
HCOV NL63 RNA SPEC QL NAA+PROBE: SIGNIFICANT CHANGE UP
HCOV OC43 RNA SPEC QL NAA+PROBE: SIGNIFICANT CHANGE UP
HCT VFR BLD CALC: 34.2 % — LOW (ref 34.5–45)
HCV AB S/CO SERPL IA: 0.19 S/CO — SIGNIFICANT CHANGE UP (ref 0–0.99)
HCV AB SERPL-IMP: SIGNIFICANT CHANGE UP
HGB BLD-MCNC: 11.5 G/DL — SIGNIFICANT CHANGE UP (ref 11.5–15.5)
HMPV RNA SPEC QL NAA+PROBE: SIGNIFICANT CHANGE UP
HPIV1 RNA SPEC QL NAA+PROBE: SIGNIFICANT CHANGE UP
HPIV2 RNA SPEC QL NAA+PROBE: SIGNIFICANT CHANGE UP
HPIV3 RNA SPEC QL NAA+PROBE: SIGNIFICANT CHANGE UP
HPIV4 RNA SPEC QL NAA+PROBE: SIGNIFICANT CHANGE UP
LEGIONELLA AG UR QL: NEGATIVE — SIGNIFICANT CHANGE UP
M PNEUMO DNA SPEC QL NAA+PROBE: SIGNIFICANT CHANGE UP
MAGNESIUM SERPL-MCNC: 2.1 MG/DL — SIGNIFICANT CHANGE UP (ref 1.6–2.6)
MCHC RBC-ENTMCNC: 27.3 PG — SIGNIFICANT CHANGE UP (ref 27–34)
MCHC RBC-ENTMCNC: 33.6 G/DL — SIGNIFICANT CHANGE UP (ref 32–36)
MCV RBC AUTO: 81.2 FL — SIGNIFICANT CHANGE UP (ref 80–100)
NRBC # BLD: 0 /100 WBCS — SIGNIFICANT CHANGE UP (ref 0–0)
NRBC # FLD: 0 K/UL — SIGNIFICANT CHANGE UP (ref 0–0)
PHOSPHATE SERPL-MCNC: 3.4 MG/DL — SIGNIFICANT CHANGE UP (ref 2.5–4.5)
PLATELET # BLD AUTO: 117 K/UL — LOW (ref 150–400)
POTASSIUM SERPL-MCNC: 3.7 MMOL/L — SIGNIFICANT CHANGE UP (ref 3.5–5.3)
POTASSIUM SERPL-SCNC: 3.7 MMOL/L — SIGNIFICANT CHANGE UP (ref 3.5–5.3)
PROT SERPL-MCNC: 6.9 G/DL — SIGNIFICANT CHANGE UP (ref 6–8.3)
RAPID RVP RESULT: SIGNIFICANT CHANGE UP
RBC # BLD: 4.21 M/UL — SIGNIFICANT CHANGE UP (ref 3.8–5.2)
RBC # FLD: 12.7 % — SIGNIFICANT CHANGE UP (ref 10.3–14.5)
RSV RNA NPH QL NAA+NON-PROBE: SIGNIFICANT CHANGE UP
RSV RNA SPEC QL NAA+PROBE: SIGNIFICANT CHANGE UP
RV+EV RNA SPEC QL NAA+PROBE: SIGNIFICANT CHANGE UP
S PNEUM AG UR QL: NEGATIVE — SIGNIFICANT CHANGE UP
SARS-COV-2 RNA SPEC QL NAA+PROBE: SIGNIFICANT CHANGE UP
SARS-COV-2 RNA SPEC QL NAA+PROBE: SIGNIFICANT CHANGE UP
SODIUM SERPL-SCNC: 132 MMOL/L — LOW (ref 135–145)
WBC # BLD: 8.06 K/UL — SIGNIFICANT CHANGE UP (ref 3.8–10.5)
WBC # FLD AUTO: 8.06 K/UL — SIGNIFICANT CHANGE UP (ref 3.8–10.5)

## 2024-12-02 PROCEDURE — 93010 ELECTROCARDIOGRAM REPORT: CPT

## 2024-12-02 PROCEDURE — 99253 IP/OBS CNSLTJ NEW/EST LOW 45: CPT | Mod: GC

## 2024-12-02 PROCEDURE — 99254 IP/OBS CNSLTJ NEW/EST MOD 60: CPT | Mod: GC

## 2024-12-02 PROCEDURE — 99232 SBSQ HOSP IP/OBS MODERATE 35: CPT

## 2024-12-02 RX ORDER — ACETAMINOPHEN 500MG 500 MG/1
1000 TABLET, COATED ORAL ONCE
Refills: 0 | Status: COMPLETED | OUTPATIENT
Start: 2024-12-02 | End: 2024-12-02

## 2024-12-02 RX ORDER — ENOXAPARIN SODIUM 30 MG/.3ML
40 INJECTION SUBCUTANEOUS EVERY 24 HOURS
Refills: 0 | Status: DISCONTINUED | OUTPATIENT
Start: 2024-12-02 | End: 2024-12-09

## 2024-12-02 RX ORDER — SODIUM CHLORIDE 0.65 %
1 AEROSOL, SPRAY (ML) NASAL
Refills: 0 | Status: DISCONTINUED | OUTPATIENT
Start: 2024-12-02 | End: 2024-12-09

## 2024-12-02 RX ORDER — GUAIFENESIN 400 MG
1200 TABLET ORAL EVERY 12 HOURS
Refills: 0 | Status: COMPLETED | OUTPATIENT
Start: 2024-12-02 | End: 2024-12-04

## 2024-12-02 RX ORDER — PETROLATUM 960 MG/G
1 OINTMENT TOPICAL THREE TIMES A DAY
Refills: 0 | Status: DISCONTINUED | OUTPATIENT
Start: 2024-12-02 | End: 2024-12-09

## 2024-12-02 RX ADMIN — ACETAMINOPHEN 500MG 400 MILLIGRAM(S): 500 TABLET, COATED ORAL at 19:49

## 2024-12-02 RX ADMIN — IPRATROPIUM BROMIDE AND ALBUTEROL SULFATE 3 MILLILITER(S): 2.5; .5 SOLUTION RESPIRATORY (INHALATION) at 08:05

## 2024-12-02 RX ADMIN — Medication 200 MILLIGRAM(S): at 05:48

## 2024-12-02 RX ADMIN — Medication 1 SPRAY(S): at 23:21

## 2024-12-02 RX ADMIN — ACETAMINOPHEN 500MG 400 MILLIGRAM(S): 500 TABLET, COATED ORAL at 03:34

## 2024-12-02 RX ADMIN — BENZONATATE 100 MILLIGRAM(S): 100 CAPSULE ORAL at 12:39

## 2024-12-02 RX ADMIN — IPRATROPIUM BROMIDE AND ALBUTEROL SULFATE 3 MILLILITER(S): 2.5; .5 SOLUTION RESPIRATORY (INHALATION) at 02:57

## 2024-12-02 RX ADMIN — BENZONATATE 100 MILLIGRAM(S): 100 CAPSULE ORAL at 23:10

## 2024-12-02 RX ADMIN — PIPERACILLIN SODIUM AND TAZOBACTAM SODIUM 25 GRAM(S): 4; .5 INJECTION, POWDER, LYOPHILIZED, FOR SOLUTION INTRAVENOUS at 07:15

## 2024-12-02 RX ADMIN — PETROLATUM 1 APPLICATION(S): 960 OINTMENT TOPICAL at 23:10

## 2024-12-02 RX ADMIN — CHLORHEXIDINE GLUCONATE 1 APPLICATION(S): 1.2 RINSE ORAL at 12:50

## 2024-12-02 RX ADMIN — IPRATROPIUM BROMIDE AND ALBUTEROL SULFATE 3 MILLILITER(S): 2.5; .5 SOLUTION RESPIRATORY (INHALATION) at 15:43

## 2024-12-02 RX ADMIN — BENZONATATE 100 MILLIGRAM(S): 100 CAPSULE ORAL at 05:48

## 2024-12-02 RX ADMIN — Medication 1200 MILLIGRAM(S): at 23:20

## 2024-12-02 RX ADMIN — SODIUM CHLORIDE 4 MILLILITER(S): 9 INJECTION, SOLUTION INTRAMUSCULAR; INTRAVENOUS; SUBCUTANEOUS at 08:07

## 2024-12-02 RX ADMIN — PIPERACILLIN SODIUM AND TAZOBACTAM SODIUM 25 GRAM(S): 4; .5 INJECTION, POWDER, LYOPHILIZED, FOR SOLUTION INTRAVENOUS at 14:59

## 2024-12-02 RX ADMIN — Medication 1200 MILLIGRAM(S): at 12:39

## 2024-12-02 RX ADMIN — Medication 1 SPRAY(S): at 17:39

## 2024-12-02 RX ADMIN — Medication 200 MILLIGRAM(S): at 00:17

## 2024-12-02 RX ADMIN — ACETAMINOPHEN 500MG 975 MILLIGRAM(S): 500 TABLET, COATED ORAL at 14:48

## 2024-12-02 RX ADMIN — ACETAMINOPHEN 500MG 975 MILLIGRAM(S): 500 TABLET, COATED ORAL at 13:48

## 2024-12-02 RX ADMIN — PIPERACILLIN SODIUM AND TAZOBACTAM SODIUM 25 GRAM(S): 4; .5 INJECTION, POWDER, LYOPHILIZED, FOR SOLUTION INTRAVENOUS at 00:12

## 2024-12-02 RX ADMIN — ENOXAPARIN SODIUM 40 MILLIGRAM(S): 30 INJECTION SUBCUTANEOUS at 23:10

## 2024-12-02 RX ADMIN — SODIUM CHLORIDE 4 MILLILITER(S): 9 INJECTION, SOLUTION INTRAMUSCULAR; INTRAVENOUS; SUBCUTANEOUS at 22:28

## 2024-12-02 RX ADMIN — BENZONATATE 100 MILLIGRAM(S): 100 CAPSULE ORAL at 00:18

## 2024-12-02 NOTE — CONSULT NOTE ADULT - SUBJECTIVE AND OBJECTIVE BOX
Patient is a 24y old  Female who presents with a chief complaint of pna (01 Dec 2024 20:57)    HPI:  Pt is a 24 F w/ hx ovarian cyst presents to ED complaining of shortness of breath, cough and fever x1 week. Pt states she went to Urgent care and pcp and was prescribed azithromycin. She states she took 3 days of it however symptoms did not improve and she continued to have fever and cough. She reports chest pain w/ cough. Denies abd pain, n/v/d, or urinary symptoms. She denies any sick contacts or recent travel. In ED, vitals T101.1, , /86, RR 20, O2 sat 89% on RA --> 95% on 2L NC. Labs significant for: ast/alt 81/96. CT Chest concerning for pneumonia, pt started on Ceftriaxone. Hospital course complicated with RRT 2/2 worsening tachypnea, pt placed on HFNC and abx switched to Zosyn. ID consulted for further recs         PAST MEDICAL & SURGICAL HISTORY:  No pertinent past medical history      No significant past surgical history          Allergies  No Known Allergies    ANTIMICROBIALS (past 90 days)  MEDICATIONS  (STANDING):  cefTRIAXone   IVPB   100 mL/Hr IV Intermittent (11-30-24 @ 21:46)   100 mL/Hr IV Intermittent (11-29-24 @ 21:55)    cefTRIAXone   IVPB   100 mL/Hr IV Intermittent (11-29-24 @ 01:38)    doxycycline IVPB   100 mL/Hr IV Intermittent (12-01-24 @ 01:48)   100 mL/Hr IV Intermittent (11-30-24 @ 13:58)   100 mL/Hr IV Intermittent (11-30-24 @ 03:07)   100 mL/Hr IV Intermittent (11-29-24 @ 13:07)    doxycycline IVPB   100 mL/Hr IV Intermittent (11-29-24 @ 02:07)    piperacillin/tazobactam IVPB.   200 mL/Hr IV Intermittent (12-01-24 @ 19:52)    piperacillin/tazobactam IVPB.-   25 mL/Hr IV Intermittent (12-02-24 @ 00:12)    piperacillin/tazobactam IVPB..   25 mL/Hr IV Intermittent (12-02-24 @ 07:15)        piperacillin/tazobactam IVPB.. 3.375 every 8 hours    MEDICATIONS  (STANDING):  acetaminophen     Tablet .. 975 every 6 hours PRN  albuterol/ipratropium for Nebulization 3 every 6 hours  benzonatate 100 every 8 hours  sodium chloride 3%  Inhalation 4 every 12 hours    SOCIAL HISTORY:       FAMILY HISTORY:    REVIEW OF SYSTEMS  [  ] ROS unobtainable because:    [  ] All other systems negative except as noted below:	    Constitutional:  [ ] fever [ ] chills  [ ] weight loss  [ ] weakness  Skin:  [ ] rash [ ] phlebitis	  Eyes: [ ] icterus [ ] pain  [ ] discharge	  ENMT: [ ] sore throat  [ ] thrush [ ] ulcers [ ] exudates  Respiratory: [ ] dyspnea [ ] hemoptysis [ ] cough [ ] sputum	  Cardiovascular:  [ ] chest pain [ ] palpitations [ ] edema	  Gastrointestinal:  [ ] nausea [ ] vomiting [ ] diarrhea [ ] constipation [ ] pain	  Genitourinary:  [ ] dysuria [ ] frequency [ ] hematuria [ ] discharge [ ] flank pain  [ ] incontinence  Musculoskeletal:  [ ] myalgias [ ] arthralgias [ ] arthritis  [ ] back pain  Neurological:  [ ] headache [ ] seizures  [ ] confusion/altered mental status  Psychiatric:  [ ] anxiety [ ] depression	  Hematology/Lymphatics:  [ ] lymphadenopathy  Endocrine:  [ ] adrenal [ ] thyroid  Allergic/Immunologic:	 [ ] transplant [ ] seasonal    Vital Signs Last 24 Hrs  T(F): 98.6 (12-02-24 @ 08:30), Max: 102.6 (11-29-24 @ 10:34)  Vital Signs Last 24 Hrs  HR: 86 (12-02-24 @ 08:30) (84 - 104)  BP: 121/75 (12-02-24 @ 08:30) (114/75 - 136/96)  RR: 24 (12-02-24 @ 08:30)  SpO2: 100% (12-02-24 @ 08:30) (98% - 100%)  Wt(kg): --    PHYSICAL EXAM:  Constitutional: non-toxic, no distress  HEAD/EYES: anicteric, no conjunctival injection  ENT:  supple, no thrush  Cardiovascular:   normal S1, S2, no murmur, no edema  Respiratory:  clear BS bilaterally, no wheezes, no rales  GI:  soft, non-tender, normal bowel sounds  :  no guzman, no CVA tenderness  Musculoskeletal:  no synovitis, normal ROM  Neurologic: awake and alert, normal strength, no focal findings  Skin:  no rash, no erythema, no phlebitis  Heme/Onc: no lymphadenopathy   Psychiatric:  awake, alert, appropriate mood                            11.5   9.11  )-----------( 114      ( 01 Dec 2024 19:52 )             33.8   12-02    132[L]  |  99  |  8   ----------------------------<  135[H]  3.7   |  23  |  0.43[L]    Ca    8.5      02 Dec 2024 07:16  Phos  3.4     12-02  Mg     2.10     12-02    TPro  7.2  /  Alb  3.0[L]  /  TBili  0.3  /  DBili  x   /  AST  127[H]  /  ALT  141[H]  /  AlkPhos  54  12-01    Urinalysis Basic - ( 02 Dec 2024 07:16 )    Color: x / Appearance: x / SG: x / pH: x  Gluc: 135 mg/dL / Ketone: x  / Bili: x / Urobili: x   Blood: x / Protein: x / Nitrite: x   Leuk Esterase: x / RBC: x / WBC x   Sq Epi: x / Non Sq Epi: x / Bacteria: x    MICROBIOLOGY:  Culture - Sputum (collected 29 Nov 2024 20:00)  Source: .Sputum Sputum  Gram Stain (30 Nov 2024 06:41):    Rare polymorphonuclear leukocytes per low power field    Rare Squamous epithelial cells per low power field    Few-moderate Gram Negative Rods seen per oil power field    Few-moderate Gram positive cocci in pairs seen per oil power field    Few Gram Positive Rods seen per oil power field  Final Report (01 Dec 2024 08:58):    Commensal jj consistent with body site    Urinalysis with Rflx Culture (collected 29 Nov 2024 11:40)    Culture - Blood (collected 29 Nov 2024 06:55)  Source: .Blood BLOOD  Preliminary Report (02 Dec 2024 09:01):    No growth at 72 Hours    Culture - Blood (collected 29 Nov 2024 01:10)  Source: .Blood BLOOD  Preliminary Report (02 Dec 2024 09:01):    No growth at 72 Hours              Rapid RVP Result: NotDetec (11-29 @ 01:29)      RADIOLOGY:  imaging below personally reviewed and agree with findings    < from: CT Chest No Cont (11.29.24 @ 16:38) >    IMPRESSION:.    Extensive consolidation throughout the lungs bilaterally, but most severe   in the left lung. Findings are compatible with pneumonia.    Very small left pleural effusion.    CT chest follow-up in one month recommended to ensure clearing.    < end of copied text >    < from: US Abdomen Upper Quadrant Right (11.30.24 @ 16:30) >    IMPRESSION:  Hepatic steatosis without focal abnormality.    < end of copied text >   Patient is a 24y old  Female who presents with a chief complaint of pna (01 Dec 2024 20:57)    HPI:  Pt is a 24 F w/ hx ovarian cyst presents to ED complaining of shortness of breath, cough and fever x1 week. Pt states she went to Urgent care and pcp and was prescribed azithromycin. She states she took 3 days of it however symptoms did not improve and she continued to have fever and cough. She reports chest pain w/ cough. Denies abd pain, n/v/d, or urinary symptoms. She denies any sick contacts or recent travel. In ED, vitals T101.1, , /86, RR 20, O2 sat 89% on RA --> 95% on 2L NC. Labs significant for: ast/alt 81/96. CT Chest concerning for pneumonia, pt started on Ceftriaxone. Hospital course complicated with RRT 2/2 worsening tachypnea, pt placed on HFNC and abx switched to Zosyn. ID consulted for further recs         PAST MEDICAL & SURGICAL HISTORY:  No pertinent past medical history      No significant past surgical history          Allergies  No Known Allergies    ANTIMICROBIALS (past 90 days)  MEDICATIONS  (STANDING):  cefTRIAXone   IVPB   100 mL/Hr IV Intermittent (11-30-24 @ 21:46)   100 mL/Hr IV Intermittent (11-29-24 @ 21:55)    cefTRIAXone   IVPB   100 mL/Hr IV Intermittent (11-29-24 @ 01:38)    doxycycline IVPB   100 mL/Hr IV Intermittent (12-01-24 @ 01:48)   100 mL/Hr IV Intermittent (11-30-24 @ 13:58)   100 mL/Hr IV Intermittent (11-30-24 @ 03:07)   100 mL/Hr IV Intermittent (11-29-24 @ 13:07)    doxycycline IVPB   100 mL/Hr IV Intermittent (11-29-24 @ 02:07)    piperacillin/tazobactam IVPB.   200 mL/Hr IV Intermittent (12-01-24 @ 19:52)    piperacillin/tazobactam IVPB.-   25 mL/Hr IV Intermittent (12-02-24 @ 00:12)    piperacillin/tazobactam IVPB..   25 mL/Hr IV Intermittent (12-02-24 @ 07:15)        piperacillin/tazobactam IVPB.. 3.375 every 8 hours    MEDICATIONS  (STANDING):  acetaminophen     Tablet .. 975 every 6 hours PRN  albuterol/ipratropium for Nebulization 3 every 6 hours  benzonatate 100 every 8 hours  sodium chloride 3%  Inhalation 4 every 12 hours    SOCIAL HISTORY: Denies any smoking. Pt lives with her parents at home, no sick contacts. 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 Born in [facility] and moved to the  when she was 13.     FAMILY HISTORY: No pertinent family hx     REVIEW OF SYSTEMS:    CONSTITUTIONAL: No weakness. + fevers and chills  EYES:  No visual changes, no eye pain   ENT: No vertigo or throat pain   NECK: No pain or stiffness  RESPIRATORY: + cough. No wheezing, hemoptysis; + shortness of breath  CARDIOVASCULAR: No chest pain or palpitations  GASTROINTESTINAL: No abdominal or epigastric pain. No nausea, vomiting, or hematemesis; No diarrhea or constipation. No melena or hematochezia.  GENITOURINARY: No dysuria, frequency or hematuria  NEUROLOGICAL: No numbness or weakness  SKIN: No itching, rashes  Psych: no anxiety or depression     Vital Signs Last 24 Hrs  T(F): 98.6 (12-02-24 @ 08:30), Max: 102.6 (11-29-24 @ 10:34)  Vital Signs Last 24 Hrs  HR: 86 (12-02-24 @ 08:30) (84 - 104)  BP: 121/75 (12-02-24 @ 08:30) (114/75 - 136/96)  RR: 24 (12-02-24 @ 08:30)  SpO2: 100% (12-02-24 @ 08:30) (98% - 100%)  Wt(kg): --    PHYSICAL EXAM:  Constitutional: non-toxic, no distress on HFNC  HEAD/EYES: anicteric, no conjunctival injection  ENT:  supple, no thrush  Cardiovascular:   normal S1, S2, no murmur, no edema  Respiratory: course breath sounds   GI:  soft, non-tender, normal bowel sounds  :  no guzman, no CVA tenderness  Neurologic: awake and alert, normal strength, no focal findings  Skin:  no rash, no erythema, no phlebitis  Psychiatric:  awake, alert, appropriate mood                            11.5   9.11  )-----------( 114      ( 01 Dec 2024 19:52 )             33.8   12-02    132[L]  |  99  |  8   ----------------------------<  135[H]  3.7   |  23  |  0.43[L]    Ca    8.5      02 Dec 2024 07:16  Phos  3.4     12-02  Mg     2.10     12-02    TPro  7.2  /  Alb  3.0[L]  /  TBili  0.3  /  DBili  x   /  AST  127[H]  /  ALT  141[H]  /  AlkPhos  54  12-01    Urinalysis Basic - ( 02 Dec 2024 07:16 )    Color: x / Appearance: x / SG: x / pH: x  Gluc: 135 mg/dL / Ketone: x  / Bili: x / Urobili: x   Blood: x / Protein: x / Nitrite: x   Leuk Esterase: x / RBC: x / WBC x   Sq Epi: x / Non Sq Epi: x / Bacteria: x    MICROBIOLOGY:  Culture - Sputum (collected 29 Nov 2024 20:00)  Source: .Sputum Sputum  Gram Stain (30 Nov 2024 06:41):    Rare polymorphonuclear leukocytes per low power field    Rare Squamous epithelial cells per low power field    Few-moderate Gram Negative Rods seen per oil power field    Few-moderate Gram positive cocci in pairs seen per oil power field    Few Gram Positive Rods seen per oil power field  Final Report (01 Dec 2024 08:58):    Commensal jj consistent with body site    Urinalysis with Rflx Culture (collected 29 Nov 2024 11:40)    Culture - Blood (collected 29 Nov 2024 06:55)  Source: .Blood BLOOD  Preliminary Report (02 Dec 2024 09:01):    No growth at 72 Hours    Culture - Blood (collected 29 Nov 2024 01:10)  Source: .Blood BLOOD  Preliminary Report (02 Dec 2024 09:01):    No growth at 72 Hours              Rapid RVP Result: NotDetec (11-29 @ 01:29)      RADIOLOGY:  imaging below personally reviewed and agree with findings    < from: CT Chest No Cont (11.29.24 @ 16:38) >    IMPRESSION:.    Extensive consolidation throughout the lungs bilaterally, but most severe   in the left lung. Findings are compatible with pneumonia.    Very small left pleural effusion.    CT chest follow-up in one month recommended to ensure clearing.    < end of copied text >    < from: US Abdomen Upper Quadrant Right (11.30.24 @ 16:30) >    IMPRESSION:  Hepatic steatosis without focal abnormality.    < end of copied text >   Patient is a 24y old  Female who presents with a chief complaint of pna (01 Dec 2024 20:57)    HPI:  Pt is a 24 F w/ hx ovarian cyst presents to ED complaining of shortness of breath, cough and fever x1 week. Pt states she went to Urgent care and pcp and was prescribed azithromycin. She states she took 3 days of it however symptoms did not improve and she continued to have fever and cough. She reports chest pain w/ cough. Denies abd pain, n/v/d, or urinary symptoms. She denies any sick contacts or recent travel. In ED, vitals T101.1, , /86, RR 20, O2 sat 89% on RA --> 95% on 2L NC. Labs significant for: ast/alt 81/96. CT Chest concerning for pneumonia, pt started on Ceftriaxone. Hospital course complicated with RRT 2/2 worsening tachypnea, pt placed on HFNC and abx switched to Zosyn. ID consulted for further recs         PAST MEDICAL & SURGICAL HISTORY:  No pertinent past medical history      No significant past surgical history        Allergies  No Known Allergies    ANTIMICROBIALS (past 90 days)  MEDICATIONS  (STANDING):  cefTRIAXone   IVPB   100 mL/Hr IV Intermittent (11-30-24 @ 21:46)   100 mL/Hr IV Intermittent (11-29-24 @ 21:55)    cefTRIAXone   IVPB   100 mL/Hr IV Intermittent (11-29-24 @ 01:38)    doxycycline IVPB   100 mL/Hr IV Intermittent (12-01-24 @ 01:48)   100 mL/Hr IV Intermittent (11-30-24 @ 13:58)   100 mL/Hr IV Intermittent (11-30-24 @ 03:07)   100 mL/Hr IV Intermittent (11-29-24 @ 13:07)    doxycycline IVPB   100 mL/Hr IV Intermittent (11-29-24 @ 02:07)    piperacillin/tazobactam IVPB.   200 mL/Hr IV Intermittent (12-01-24 @ 19:52)    piperacillin/tazobactam IVPB.-   25 mL/Hr IV Intermittent (12-02-24 @ 00:12)    piperacillin/tazobactam IVPB..   25 mL/Hr IV Intermittent (12-02-24 @ 07:15)        piperacillin/tazobactam IVPB.. 3.375 every 8 hours    MEDICATIONS  (STANDING):  acetaminophen     Tablet .. 975 every 6 hours PRN  albuterol/ipratropium for Nebulization 3 every 6 hours  benzonatate 100 every 8 hours  sodium chloride 3%  Inhalation 4 every 12 hours    SOCIAL HISTORY: Denies any smoking. Pt lives with her parents at home, no sick contacts. 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 Born in [facility] and moved to the  when she was 13.     FAMILY HISTORY: No pertinent family hx     REVIEW OF SYSTEMS:  CONSTITUTIONAL: No weakness. + fevers and chills  EYES:  No visual changes, no eye pain   ENT: No vertigo or throat pain   NECK: No pain or stiffness  RESPIRATORY: + cough. No wheezing, hemoptysis; + shortness of breath  CARDIOVASCULAR: No chest pain or palpitations  GASTROINTESTINAL: No abdominal or epigastric pain. No nausea, vomiting, or hematemesis; No diarrhea or constipation. No melena or hematochezia.  GENITOURINARY: No dysuria, frequency or hematuria  NEUROLOGICAL: No numbness or weakness  SKIN: No itching, rashes  Psych: no anxiety or depression     Vital Signs Last 24 Hrs  T(F): 98.6 (12-02-24 @ 08:30), Max: 102.6 (11-29-24 @ 10:34)  Vital Signs Last 24 Hrs  HR: 86 (12-02-24 @ 08:30) (84 - 104)  BP: 121/75 (12-02-24 @ 08:30) (114/75 - 136/96)  RR: 24 (12-02-24 @ 08:30)  SpO2: 100% (12-02-24 @ 08:30) (98% - 100%)  Wt(kg): --    PHYSICAL EXAM:  Constitutional: non-toxic, no distress on HFNC  HEAD/EYES: anicteric, no conjunctival injection  ENT:  supple, no thrush  Cardiovascular:   normal S1, S2, no murmur, no edema  Respiratory: course breath sounds   GI:  soft, non-tender, normal bowel sounds  :  no guzman, no CVA tenderness  Neurologic: awake and alert, normal strength, no focal findings  Skin:  no rash, no erythema, no phlebitis  Psychiatric:  awake, alert, appropriate mood                            11.5   9.11  )-----------( 114      ( 01 Dec 2024 19:52 )             33.8   12-02    132[L]  |  99  |  8   ----------------------------<  135[H]  3.7   |  23  |  0.43[L]    Ca    8.5      02 Dec 2024 07:16  Phos  3.4     12-02  Mg     2.10     12-02    TPro  7.2  /  Alb  3.0[L]  /  TBili  0.3  /  DBili  x   /  AST  127[H]  /  ALT  141[H]  /  AlkPhos  54  12-01    Urinalysis Basic - ( 02 Dec 2024 07:16 )    Color: x / Appearance: x / SG: x / pH: x  Gluc: 135 mg/dL / Ketone: x  / Bili: x / Urobili: x   Blood: x / Protein: x / Nitrite: x   Leuk Esterase: x / RBC: x / WBC x   Sq Epi: x / Non Sq Epi: x / Bacteria: x    MICROBIOLOGY:  Culture - Sputum (collected 29 Nov 2024 20:00)  Source: .Sputum Sputum  Gram Stain (30 Nov 2024 06:41):    Rare polymorphonuclear leukocytes per low power field    Rare Squamous epithelial cells per low power field    Few-moderate Gram Negative Rods seen per oil power field    Few-moderate Gram positive cocci in pairs seen per oil power field    Few Gram Positive Rods seen per oil power field  Final Report (01 Dec 2024 08:58):    Commensal jj consistent with body site    Urinalysis with Rflx Culture (collected 29 Nov 2024 11:40)    Culture - Blood (collected 29 Nov 2024 06:55)  Source: .Blood BLOOD  Preliminary Report (02 Dec 2024 09:01):    No growth at 72 Hours    Culture - Blood (collected 29 Nov 2024 01:10)  Source: .Blood BLOOD  Preliminary Report (02 Dec 2024 09:01):    No growth at 72 Hours              Rapid RVP Result: NotDetec (11-29 @ 01:29)      RADIOLOGY:  imaging below personally reviewed and agree with findings    < from: CT Chest No Cont (11.29.24 @ 16:38) >    IMPRESSION:.    Extensive consolidation throughout the lungs bilaterally, but most severe   in the left lung. Findings are compatible with pneumonia.    Very small left pleural effusion.    CT chest follow-up in one month recommended to ensure clearing.    < end of copied text >    < from: US Abdomen Upper Quadrant Right (11.30.24 @ 16:30) >    IMPRESSION:  Hepatic steatosis without focal abnormality.    < end of copied text >   Patient is a 24y old  Female who presents with a chief complaint of pna (01 Dec 2024 20:57)    HPI:  Pt is a 24 F w/ hx ovarian cyst presents to ED complaining of shortness of breath, cough and fever x1 week. Pt states she went to Urgent care and pcp and was prescribed azithromycin. She states she took 3 days of it however symptoms did not improve and she continued to have fever and cough. She reports chest pain w/ cough. Denies abd pain, n/v/d, or urinary symptoms. She denies any sick contacts or recent travel. In ED, vitals T101.1, , /86, RR 20, O2 sat 89% on RA --> 95% on 2L NC. Labs significant for: ast/alt 81/96. CT Chest concerning for pneumonia, pt started on Ceftriaxone. Hospital course complicated with RRT 2/2 worsening tachypnea, pt placed on HFNC and abx switched to Zosyn. ID consulted for further recs         PAST MEDICAL & SURGICAL HISTORY:  No pertinent past medical history      No significant past surgical history        Allergies  No Known Allergies    ANTIMICROBIALS (past 90 days)  MEDICATIONS  (STANDING):  cefTRIAXone   IVPB   100 mL/Hr IV Intermittent (11-30-24 @ 21:46)   100 mL/Hr IV Intermittent (11-29-24 @ 21:55)    cefTRIAXone   IVPB   100 mL/Hr IV Intermittent (11-29-24 @ 01:38)    doxycycline IVPB   100 mL/Hr IV Intermittent (12-01-24 @ 01:48)   100 mL/Hr IV Intermittent (11-30-24 @ 13:58)   100 mL/Hr IV Intermittent (11-30-24 @ 03:07)   100 mL/Hr IV Intermittent (11-29-24 @ 13:07)    doxycycline IVPB   100 mL/Hr IV Intermittent (11-29-24 @ 02:07)    piperacillin/tazobactam IVPB.   200 mL/Hr IV Intermittent (12-01-24 @ 19:52)    piperacillin/tazobactam IVPB.-   25 mL/Hr IV Intermittent (12-02-24 @ 00:12)    piperacillin/tazobactam IVPB..   25 mL/Hr IV Intermittent (12-02-24 @ 07:15)        piperacillin/tazobactam IVPB.. 3.375 every 8 hours    MEDICATIONS  (STANDING):  acetaminophen     Tablet .. 975 every 6 hours PRN  albuterol/ipratropium for Nebulization 3 every 6 hours  benzonatate 100 every 8 hours  sodium chloride 3%  Inhalation 4 every 12 hours      SOCIAL HISTORY: Denies any smoking. Pt lives with her parents at home, no sick contacts. 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 Born in [facility] and moved to the  when she was 13.         FAMILY HISTORY:   Mother: DM         REVIEW OF SYSTEMS:  CONSTITUTIONAL: No weakness. + fevers and chills  EYES:  No eye pain   ENT: No  throat pain   NECK: No pain or stiffness  RESPIRATORY: + cough.  + shortness of breath  CARDIOVASCULAR: + chest pain, pleuritic   GASTROINTESTINAL: No abdominal pain. No nausea, vomiting  GENITOURINARY: No dysuria,  NEUROLOGICAL: No numbness or weakness  SKIN: No rashes  Psych: no anxiety or depression         Vital Signs Last 24 Hrs  T(F): 98.6 (12-02-24 @ 08:30), Max: 102.6 (11-29-24 @ 10:34)  Vital Signs Last 24 Hrs  HR: 86 (12-02-24 @ 08:30) (84 - 104)  BP: 121/75 (12-02-24 @ 08:30) (114/75 - 136/96)  RR: 24 (12-02-24 @ 08:30)  SpO2: 100% (12-02-24 @ 08:30) (98% - 100%)  Wt(kg): --      PHYSICAL EXAM:  Constitutional: non-toxic, no distress on HFNC  HEAD/EYES: anicteric, no conjunctival injection  ENT:  supple, no thrush  Cardiovascular:   normal S1, S2, normal   Respiratory: course breath sounds   GI:  soft, non-tender,   :  no guzman,   Neurologic: awake and alert, normal strength, no focal findings  Skin:  no rash,  Extremities: No edema   Psychiatric:  awake, alert, appropriate mood                            11.5   9.11  )-----------( 114      ( 01 Dec 2024 19:52 )             33.8   12-02    132[L]  |  99  |  8   ----------------------------<  135[H]  3.7   |  23  |  0.43[L]    Ca    8.5      02 Dec 2024 07:16  Phos  3.4     12-02  Mg     2.10     12-02    TPro  7.2  /  Alb  3.0[L]  /  TBili  0.3  /  DBili  x   /  AST  127[H]  /  ALT  141[H]  /  AlkPhos  54  12-01    Urinalysis Basic - ( 02 Dec 2024 07:16 )    Color: x / Appearance: x / SG: x / pH: x  Gluc: 135 mg/dL / Ketone: x  / Bili: x / Urobili: x   Blood: x / Protein: x / Nitrite: x   Leuk Esterase: x / RBC: x / WBC x   Sq Epi: x / Non Sq Epi: x / Bacteria: x    MICROBIOLOGY:  Culture - Sputum (collected 29 Nov 2024 20:00)  Source: .Sputum Sputum  Gram Stain (30 Nov 2024 06:41):    Rare polymorphonuclear leukocytes per low power field    Rare Squamous epithelial cells per low power field    Few-moderate Gram Negative Rods seen per oil power field    Few-moderate Gram positive cocci in pairs seen per oil power field    Few Gram Positive Rods seen per oil power field  Final Report (01 Dec 2024 08:58):    Commensal jj consistent with body site    Urinalysis with Rflx Culture (collected 29 Nov 2024 11:40)    Culture - Blood (collected 29 Nov 2024 06:55)  Source: .Blood BLOOD  Preliminary Report (02 Dec 2024 09:01):    No growth at 72 Hours    Culture - Blood (collected 29 Nov 2024 01:10)  Source: .Blood BLOOD  Preliminary Report (02 Dec 2024 09:01):    No growth at 72 Hours        Rapid RVP Result: NotDetec (11-29 @ 01:29)      RADIOLOGY:  imaging below personally reviewed and agree with findings except U/S    < from: CT Chest No Cont (11.29.24 @ 16:38) >    IMPRESSION:.    Extensive consolidation throughout the lungs bilaterally, but most severe   in the left lung. Findings are compatible with pneumonia.    Very small left pleural effusion.    CT chest follow-up in one month recommended to ensure clearing.        < from: US Abdomen Upper Quadrant Right (11.30.24 @ 16:30) >    IMPRESSION:  Hepatic steatosis without focal abnormality.

## 2024-12-02 NOTE — PROGRESS NOTE ADULT - PROBLEM SELECTOR PLAN 1
Pt febrile, tachy, hypoxic to 89% on RA requiring 2L NC. Likely 2/2 pna. No improvement on azithromycin outpatient. s/p IV ceftriaxone and IV doxycycline in ED. PCT 0.47 on admission.  urine legionella neg, strep neg. Sputum cx showing commensal jj, BCx NGTD thus far. RVP negative.   - CT Chest non contrast on 11/29 showing: extensive consolidation throughout the lungs bilaterally, most severe in L lung; findings compatible with PNA. Very small L pleural effusion.   - was previously on CTX. s/p RRT on 12/1 for tachypnea, tachycardia, worsening hypoxia and now on HFNC at 40L 40% FiO2 and abx broadened zosyn. Seen by MICU and no MICU needs.   - given worsening of clinical presentation, ID and Pulm consulted for abx guidance and further management.   - c/w pulmonary toilet: chest PT, aerobika q6, incentive spirometer, HTS 3% 4ml bid  - add mucinex bid, tessalon perles, and duonebs  - wean O2 as tolerated, weaned off oxygen as tolerated with goal >92%

## 2024-12-02 NOTE — PROGRESS NOTE ADULT - ASSESSMENT
Pt is a 24 F w/ hx ovarian cyst presents to ED complaining of shortness of breath, fever x1 week. Pt admitted for sepsis w/ acute hypoxic respiratory failure 2/2 pna. Hospital course notable worsening hypoxia, tachypnea, with RRT called on 12/1 and broadening of abx. Seen by MICU then and no MICU needs. Pulm and ID following for further recs. Also with transaminitis, RUQ showing hepatic steatosis.

## 2024-12-02 NOTE — CONSULT NOTE ADULT - ATTENDING COMMENTS
24F with pneumonia. Dense consolidation on CT.  Pulmonary consulted for further management.  Need atypical coverage - agree with Levaquin.  Check legionella PCR, MRSA swab, sputum culture.  Reports feeling slightly better.  Course not unexpected given degree of pneumonia.  Wean HFNC as tolerated.
Miriam Myers is a 24 year old w/history of ovarian cyst who presented to the ED for fever, shortness of breath for approximately one week.  She was admitted for acute hypoxic respiratory failure secondary to pneumonia.  MICU was consulted for further evaluation for tachypnea, hypoxia, and fever    #Multifocal pneumonia  #Hypoxic respiratory failure  -continue broad spectrum antibiotics  -maintain patient with right lung down given larger consolidation on left side  -transition to HFNC, follow up repeat VBG  -provide/encourage incentive spirometry  -ensure full infectious work up sent (blood/urine cultures, urine strep, urine legionella, full RVP)      Patient does not require MICU admission at this time, please re-consult as necessary for further evaluation.
24 F w/ hx ovarian cyst presents to ED complaining of shortness of breath, fever x1 week. Pt admitted for sepsis w/ acute hypoxic respiratory failure 2/2 PNA    Assessment:  #Pneumonia  #Sepsis, fever, tachycardia   #Hypoxia      -Pt febrile in the ED with normal WBC. Labs significant for elevated LFTs  -CT chest showing Extensive consolidation throughout the lungs bilaterally, but most severe in the left lung. Findings are compatible with pneumonia. Very small left pleural effusion.  -RUQ US showing Hepatic steatosis without focal abnormality.  -UA neg, BCX NGTD. Urine legionella neg, Urine strep neg  -Pt currently on HFNC    Recommendation:  -Start Levaquin 750mg Q24, can stop zosyn   -Recommend repeat CT Chest to evaluate for parapneumonic effusion since pt with worsening O2 requirement  -Cont to monitor fever and WBC curve  -check procal     Plan discussed with consulting team.    Linda Reed  Please contact through MS Teams   If no response or past 5 pm/weekend call [phone].

## 2024-12-02 NOTE — CONSULT NOTE ADULT - SUBJECTIVE AND OBJECTIVE BOX
HPI:    23 yo F with PMH ovarian cyst presents to ED with 1 week shortness of breath, cough, and fever. She went to urgent care and was prescribed azithromycin - she took 3 days without improvement in symptoms. No sick contacts, no recent travel. No hx of prior illness. Productive cough has improved slightly. Found to be febrile to 101 and hypoxemic requiring supplemental oxygen. Hospital course c/b worsening tachypnea and RRT during which she was placed on HFNC. She was started on CTX and then broadened to zosyn.   Nonsmoker, does not vape. Works as dental tech.  Pulmonary and ID consulted.    PAST MEDICAL & SURGICAL HISTORY:  No pertinent past medical history      No significant past surgical history          FAMILY HISTORY:      SOCIAL HISTORY:  Smoking: [z ] Never Smoked [ ] Former Smoker (__ packs x ___ years) [ ] Current Smoker  (__ packs x ___ years)  Substance Use: [ ] Never Used [ ] Used ____  EtOH Use:  Marital Status: [ ] Single [ ]  [ ]  [ ]   Sexual History:   Occupation: dental tech  Recent Travel: none  Country of Birth:  Advance Directives:    Allergies    No Known Allergies    Intolerances        HOME MEDICATIONS:  Home Medications:      REVIEW OF SYSTEMS:  All systems negative except as documented above.    OBJECTIVE:  ICU Vital Signs Last 24 Hrs  T(C): 37.8 (02 Dec 2024 13:45), Max: 38.3 (01 Dec 2024 21:15)  T(F): 100.1 (02 Dec 2024 13:45), Max: 101 (01 Dec 2024 21:15)  HR: 75 (02 Dec 2024 12:30) (75 - 104)  BP: 128/76 (02 Dec 2024 12:30) (114/75 - 134/92)  BP(mean): --  ABP: --  ABP(mean): --  RR: 24 (02 Dec 2024 12:30) (20 - 40)  SpO2: 100% (02 Dec 2024 12:30) (99% - 100%)    O2 Parameters below as of 02 Dec 2024 12:30  Patient On (Oxygen Delivery Method): nasal cannula, high flow              CAPILLARY BLOOD GLUCOSE      POCT Blood Glucose.: 115 mg/dL (01 Dec 2024 19:31)      PHYSICAL EXAM:  General: NAD  HEENT: EOMI, sclera anicteric  Neck: supple  Cardiovascular: RR  Respiratory: dec R base breath sounds  Abdomen: soft  Extremities: warm and well perfused, no edema, no clubbing  Skin: no rashes  Neurological: AOx3, no focal deficits    HOSPITAL MEDICATIONS:  Standing Meds:  albuterol/ipratropium for Nebulization 3 milliLiter(s) Nebulizer every 6 hours  benzonatate 100 milliGRAM(s) Oral every 8 hours  chlorhexidine 2% Cloths 1 Application(s) Topical daily  enoxaparin Injectable 40 milliGRAM(s) SubCutaneous every 24 hours  guaiFENesin ER 1200 milliGRAM(s) Oral every 12 hours  levoFLOXacin IVPB      petrolatum white Ointment 1 Application(s) Topical three times a day  sodium chloride 3%  Inhalation 4 milliLiter(s) Inhalation every 12 hours      PRN Meds:  acetaminophen     Tablet .. 975 milliGRAM(s) Oral every 6 hours PRN  sodium chloride 0.65% Nasal 1 Spray(s) Both Nostrils every 2 hours PRN      LABS:                        11.5   8.06  )-----------( 117      ( 02 Dec 2024 07:16 )             34.2     Hgb Trend: 11.5<--, 11.5<--, 12.6<--, 12.2<--  12-02    132[L]  |  99  |  8   ----------------------------<  135[H]  3.7   |  23  |  0.43[L]    Ca    8.5      02 Dec 2024 07:16  Phos  3.4     12-02  Mg     2.10     12-02    TPro  6.9  /  Alb  2.9[L]  /  TBili  0.3  /  DBili  <0.2  /  AST  127[H]  /  ALT  147[H]  /  AlkPhos  51  12-02    Creatinine Trend: 0.43<--, 0.45<--, 0.46<--, 0.57<--  PT/INR - ( 01 Dec 2024 19:40 )   PT: 14.0 sec;   INR: 1.21 ratio         PTT - ( 01 Dec 2024 19:40 )  PTT:29.3 sec  Urinalysis Basic - ( 02 Dec 2024 07:16 )    Color: x / Appearance: x / SG: x / pH: x  Gluc: 135 mg/dL / Ketone: x  / Bili: x / Urobili: x   Blood: x / Protein: x / Nitrite: x   Leuk Esterase: x / RBC: x / WBC x   Sq Epi: x / Non Sq Epi: x / Bacteria: x        Venous Blood Gas:  12-01 @ 19:52  7.44/36/69/24/94.1  VBG Lactate: 1.5      MICROBIOLOGY:     Culture - Sputum (collected 29 Nov 2024 20:00)  Source: .Sputum Sputum  Gram Stain (30 Nov 2024 06:41):    Rare polymorphonuclear leukocytes per low power field    Rare Squamous epithelial cells per low power field    Few-moderate Gram Negative Rods seen per oil power field    Few-moderate Gram positive cocci in pairs seen per oil power field    Few Gram Positive Rods seen per oil power field  Final Report (01 Dec 2024 08:58):    Commensal jj consistent with body site        RADIOLOGY:  [x] Reviewed and interpreted by me

## 2024-12-02 NOTE — CHART NOTE - NSCHARTNOTEFT_GEN_A_CORE
Pulm consulted     Marisela Lay PA-C  Department of Internal Medicine  pager [phone], available on [employer] TEAMS

## 2024-12-02 NOTE — CONSULT NOTE ADULT - ASSESSMENT
24 F w/ hx ovarian cyst presents to ED complaining of shortness of breath, fever x1 week. Pt admitted for sepsis w/ acute hypoxic respiratory failure 2/2 PNA    Assessment:  #Pneumonia  #Sepsis  -Pt febrile in the ED with normal WBC. Labs significant for elevated LFTs  -CT chest showing Extensive consolidation throughout the lungs bilaterally, but most severe in the left lung. Findings are compatible with pneumonia. Very small left pleural effusion.  -RUQ US showing Hepatic steatosis without focal abnormality.  -UA neg, BCX NGTD. Urine legionella neg, Urine strep neg  -Pt currently on HFNC    Recommendation:     24 F w/ hx ovarian cyst presents to ED complaining of shortness of breath, fever x1 week. Pt admitted for sepsis w/ acute hypoxic respiratory failure 2/2 PNA    Assessment:  #Pneumonia  #Sepsis  -Pt febrile in the ED with normal WBC. Labs significant for elevated LFTs  -CT chest showing Extensive consolidation throughout the lungs bilaterally, but most severe in the left lung. Findings are compatible with pneumonia. Very small left pleural effusion.  -RUQ US showing Hepatic steatosis without focal abnormality.  -UA neg, BCX NGTD. Urine legionella neg, Urine strep neg  -Pt currently on HFNC    Recommendation:  -Start Levaquin 750mg Q24, can stop zosyn   -Recommend repeat CT Chest to evaluate for parapneumonic effusion since pt with worsening O2 requirement  -Cont to monitor fever and WBC curve    Esme Stuart  ID Fellow

## 2024-12-02 NOTE — CONSULT NOTE ADULT - ASSESSMENT
25 yo F with PMH ovarian cyst presents to ED with 1 week shortness of breath, cough, and fever. Found to have large L base consolidation c/w pneumonia. Pulmonary consulted for further management.     # pneumonia  # acute hypoxemic respiratory failure  - legionella antigen negative, please send legionella pcr   - strep pneumo neg  - sputum cx: normal respiratory jj   - please resend sputum cx, HIV  - mrsa negative  - continue abx per ID  - continue HFNC 40/40 for now  - incentive spirometry, oob to chair as able      Maria Alejandra Kay MD  Pulmonary and Critical Care Fellow

## 2024-12-03 LAB
ADD ON TEST-SPECIMEN IN LAB: SIGNIFICANT CHANGE UP
ALBUMIN SERPL ELPH-MCNC: 3 G/DL — LOW (ref 3.3–5)
ALP SERPL-CCNC: 59 U/L — SIGNIFICANT CHANGE UP (ref 40–120)
ALT FLD-CCNC: 182 U/L — HIGH (ref 4–33)
ANION GAP SERPL CALC-SCNC: 14 MMOL/L — SIGNIFICANT CHANGE UP (ref 7–14)
AST SERPL-CCNC: 135 U/L — HIGH (ref 4–32)
BILIRUB DIRECT SERPL-MCNC: <0.2 MG/DL — SIGNIFICANT CHANGE UP (ref 0–0.3)
BILIRUB INDIRECT FLD-MCNC: >0.1 MG/DL — SIGNIFICANT CHANGE UP (ref 0–1)
BILIRUB SERPL-MCNC: 0.3 MG/DL — SIGNIFICANT CHANGE UP (ref 0.2–1.2)
BUN SERPL-MCNC: 8 MG/DL — SIGNIFICANT CHANGE UP (ref 7–23)
CALCIUM SERPL-MCNC: 9 MG/DL — SIGNIFICANT CHANGE UP (ref 8.4–10.5)
CHLORIDE SERPL-SCNC: 99 MMOL/L — SIGNIFICANT CHANGE UP (ref 98–107)
CO2 SERPL-SCNC: 21 MMOL/L — LOW (ref 22–31)
CREAT SERPL-MCNC: 0.47 MG/DL — LOW (ref 0.5–1.3)
EGFR: 137 ML/MIN/1.73M2 — SIGNIFICANT CHANGE UP
FERRITIN SERPL-MCNC: 418 NG/ML — HIGH (ref 15–150)
FOLATE SERPL-MCNC: 9.9 NG/ML — SIGNIFICANT CHANGE UP (ref 3.1–17.5)
GLUCOSE SERPL-MCNC: 106 MG/DL — HIGH (ref 70–99)
HCT VFR BLD CALC: 35.8 % — SIGNIFICANT CHANGE UP (ref 34.5–45)
HGB BLD-MCNC: 11.8 G/DL — SIGNIFICANT CHANGE UP (ref 11.5–15.5)
HIV 1+2 AB+HIV1 P24 AG SERPL QL IA: SIGNIFICANT CHANGE UP
IRON SATN MFR SERPL: 14 UG/DL — LOW (ref 30–160)
IRON SATN MFR SERPL: 7 % — LOW (ref 14–50)
MAGNESIUM SERPL-MCNC: 2.1 MG/DL — SIGNIFICANT CHANGE UP (ref 1.6–2.6)
MCHC RBC-ENTMCNC: 27.7 PG — SIGNIFICANT CHANGE UP (ref 27–34)
MCHC RBC-ENTMCNC: 33 G/DL — SIGNIFICANT CHANGE UP (ref 32–36)
MCV RBC AUTO: 84 FL — SIGNIFICANT CHANGE UP (ref 80–100)
NRBC # BLD: 0 /100 WBCS — SIGNIFICANT CHANGE UP (ref 0–0)
NRBC # FLD: 0 K/UL — SIGNIFICANT CHANGE UP (ref 0–0)
PHOSPHATE SERPL-MCNC: 3.2 MG/DL — SIGNIFICANT CHANGE UP (ref 2.5–4.5)
PLATELET # BLD AUTO: 157 K/UL — SIGNIFICANT CHANGE UP (ref 150–400)
POTASSIUM SERPL-MCNC: 4.4 MMOL/L — SIGNIFICANT CHANGE UP (ref 3.5–5.3)
POTASSIUM SERPL-SCNC: 4.4 MMOL/L — SIGNIFICANT CHANGE UP (ref 3.5–5.3)
PROCALCITONIN SERPL-MCNC: 0.58 NG/ML — HIGH (ref 0.02–0.1)
PROT SERPL-MCNC: 7.6 G/DL — SIGNIFICANT CHANGE UP (ref 6–8.3)
RBC # BLD: 4.26 M/UL — SIGNIFICANT CHANGE UP (ref 3.8–5.2)
RBC # FLD: 12.9 % — SIGNIFICANT CHANGE UP (ref 10.3–14.5)
SODIUM SERPL-SCNC: 134 MMOL/L — LOW (ref 135–145)
TIBC SERPL-MCNC: 193 UG/DL — LOW (ref 220–430)
TRANSFERRIN SERPL-MCNC: 177 MG/DL — LOW (ref 200–360)
UIBC SERPL-MCNC: 179 UG/DL — SIGNIFICANT CHANGE UP (ref 110–370)
VIT B12 SERPL-MCNC: 922 PG/ML — HIGH (ref 200–900)
WBC # BLD: 10.04 K/UL — SIGNIFICANT CHANGE UP (ref 3.8–10.5)
WBC # FLD AUTO: 10.04 K/UL — SIGNIFICANT CHANGE UP (ref 3.8–10.5)

## 2024-12-03 PROCEDURE — 99232 SBSQ HOSP IP/OBS MODERATE 35: CPT

## 2024-12-03 PROCEDURE — 99233 SBSQ HOSP IP/OBS HIGH 50: CPT | Mod: GC

## 2024-12-03 RX ORDER — KETOROLAC TROMETHAMINE 30 MG/ML
15 INJECTION INTRAMUSCULAR; INTRAVENOUS ONCE
Refills: 0 | Status: DISCONTINUED | OUTPATIENT
Start: 2024-12-03 | End: 2024-12-03

## 2024-12-03 RX ORDER — ACETAMINOPHEN, DIPHENHYDRAMINE HCL, PHENYLEPHRINE HCL 325; 25; 5 MG/1; MG/1; MG/1
3 TABLET ORAL AT BEDTIME
Refills: 0 | Status: DISCONTINUED | OUTPATIENT
Start: 2024-12-03 | End: 2024-12-09

## 2024-12-03 RX ORDER — ACETAMINOPHEN 500MG 500 MG/1
1000 TABLET, COATED ORAL ONCE
Refills: 0 | Status: COMPLETED | OUTPATIENT
Start: 2024-12-03 | End: 2024-12-03

## 2024-12-03 RX ADMIN — IPRATROPIUM BROMIDE AND ALBUTEROL SULFATE 3 MILLILITER(S): 2.5; .5 SOLUTION RESPIRATORY (INHALATION) at 19:59

## 2024-12-03 RX ADMIN — Medication 1 SPRAY(S): at 06:24

## 2024-12-03 RX ADMIN — KETOROLAC TROMETHAMINE 15 MILLIGRAM(S): 30 INJECTION INTRAMUSCULAR; INTRAVENOUS at 01:55

## 2024-12-03 RX ADMIN — IPRATROPIUM BROMIDE AND ALBUTEROL SULFATE 3 MILLILITER(S): 2.5; .5 SOLUTION RESPIRATORY (INHALATION) at 03:39

## 2024-12-03 RX ADMIN — PETROLATUM 1 APPLICATION(S): 960 OINTMENT TOPICAL at 12:32

## 2024-12-03 RX ADMIN — IPRATROPIUM BROMIDE AND ALBUTEROL SULFATE 3 MILLILITER(S): 2.5; .5 SOLUTION RESPIRATORY (INHALATION) at 09:02

## 2024-12-03 RX ADMIN — Medication 1 SPRAY(S): at 12:34

## 2024-12-03 RX ADMIN — SODIUM CHLORIDE 4 MILLILITER(S): 9 INJECTION, SOLUTION INTRAMUSCULAR; INTRAVENOUS; SUBCUTANEOUS at 19:59

## 2024-12-03 RX ADMIN — Medication 1200 MILLIGRAM(S): at 12:29

## 2024-12-03 RX ADMIN — PETROLATUM 1 APPLICATION(S): 960 OINTMENT TOPICAL at 21:22

## 2024-12-03 RX ADMIN — PETROLATUM 1 APPLICATION(S): 960 OINTMENT TOPICAL at 06:26

## 2024-12-03 RX ADMIN — ACETAMINOPHEN, DIPHENHYDRAMINE HCL, PHENYLEPHRINE HCL 3 MILLIGRAM(S): 325; 25; 5 TABLET ORAL at 21:22

## 2024-12-03 RX ADMIN — CHLORHEXIDINE GLUCONATE 1 APPLICATION(S): 1.2 RINSE ORAL at 12:33

## 2024-12-03 RX ADMIN — ACETAMINOPHEN 500MG 400 MILLIGRAM(S): 500 TABLET, COATED ORAL at 15:29

## 2024-12-03 RX ADMIN — SODIUM CHLORIDE 4 MILLILITER(S): 9 INJECTION, SOLUTION INTRAMUSCULAR; INTRAVENOUS; SUBCUTANEOUS at 09:02

## 2024-12-03 RX ADMIN — ACETAMINOPHEN 500MG 975 MILLIGRAM(S): 500 TABLET, COATED ORAL at 08:38

## 2024-12-03 RX ADMIN — ENOXAPARIN SODIUM 40 MILLIGRAM(S): 30 INJECTION SUBCUTANEOUS at 21:29

## 2024-12-03 RX ADMIN — BENZONATATE 100 MILLIGRAM(S): 100 CAPSULE ORAL at 06:22

## 2024-12-03 RX ADMIN — IPRATROPIUM BROMIDE AND ALBUTEROL SULFATE 3 MILLILITER(S): 2.5; .5 SOLUTION RESPIRATORY (INHALATION) at 15:50

## 2024-12-03 RX ADMIN — Medication 1 SPRAY(S): at 22:29

## 2024-12-03 NOTE — PROGRESS NOTE ADULT - SUBJECTIVE AND OBJECTIVE BOX
[facility] Department of Hospital Medicine  Washington Lamas DO  Available on MS Teams  Pager: [phone]    Patient is a 24y old  Female who presents with a chief complaint of pna (03 Dec 2024 09:41)    OVERNIGHT EVENTS: No acute events overnight    SUBJECTIVE: Pt seen and examined at bedside this morning and weaned from HFNC to 3 L NC. still had fevers to 102.8F overnight with 101.8 this morning, tachycardic at times on monitor (sinus tach) still feels a bit lightheaded when ambulating, getting to the rest room. +BM. reports decreased appetite as well. still with cough, improving. Procal 0.58. Neg RVP. NUNU noted on labs, no actively menstruating. Neg strep PNA and neg BCx. d/w pulm and want to do pocus instead of ct to avoid further radiation. Pocus done and     ADDITIONAL REVIEW OF SYSTEMS:    MEDICATIONS  (STANDING):  albuterol/ipratropium for Nebulization 3 milliLiter(s) Nebulizer every 6 hours  chlorhexidine 2% Cloths 1 Application(s) Topical daily  enoxaparin Injectable 40 milliGRAM(s) SubCutaneous every 24 hours  guaiFENesin ER 1200 milliGRAM(s) Oral every 12 hours  levoFLOXacin IVPB 750 milliGRAM(s) IV Intermittent every 24 hours  levoFLOXacin IVPB      melatonin 3 milliGRAM(s) Oral at bedtime  petrolatum white Ointment 1 Application(s) Topical three times a day  sodium chloride 3%  Inhalation 4 milliLiter(s) Inhalation every 12 hours    MEDICATIONS  (PRN):  acetaminophen     Tablet .. 975 milliGRAM(s) Oral every 6 hours PRN Temp greater or equal to 38C (100.4F), Mild Pain (1 - 3)  sodium chloride 0.65% Nasal 1 Spray(s) Both Nostrils every 2 hours PRN Nasal Congestion      CAPILLARY BLOOD GLUCOSE        I&O's Summary      PHYSICAL EXAM:  Vital Signs Last 24 Hrs  T(C): 37.2 (03 Dec 2024 16:00), Max: 39.3 (02 Dec 2024 20:20)  T(F): 98.9 (03 Dec 2024 16:00), Max: 102.8 (02 Dec 2024 20:20)  HR: 101 (03 Dec 2024 16:16) (71 - 116)  BP: 110/70 (03 Dec 2024 16:00) (105/68 - 135/89)  BP(mean): --  RR: 18 (03 Dec 2024 16:00) (18 - 36)  SpO2: 100% (03 Dec 2024 16:00) (93% - 100%)    Parameters below as of 03 Dec 2024 16:16  Patient On (Oxygen Delivery Method): nasal cannula w/ humidification      Physical Exam  CONSTITUTIONAL: NAD, well-developed, resting, talking in full sentences without accessory mm use  HEENT: clear conjunctiva, PEERLA, EOMI, no rhinorrhea, no pharyngeal erythema, no cervical LAD appreciated.   RESPIRATORY: rhonchi   CARDIOVASCULAR: RRR, S1 and S2 present, no m/r/g  ABDOMEN: soft, NT, ND, bowel sounds present  EXTREMITIES: No LE edema b/l   MUSCULOSKELETAL: no joint swelling, no tenderness to palpation  NEURO: A&Ox3, moving all extremities independently.     LABS:                        11.8   10.04 )-----------( 157      ( 03 Dec 2024 06:43 )             35.8     12-03    134[L]  |  99  |  8   ----------------------------<  106[H]  4.4   |  21[L]  |  0.47[L]    Ca    9.0      03 Dec 2024 06:43  Phos  3.2     12-03  Mg     2.10     12-03    TPro  6.9  /  Alb  2.9[L]  /  TBili  0.3  /  DBili  <0.2  /  AST  127[H]  /  ALT  147[H]  /  AlkPhos  51  12-02    PT/INR - ( 01 Dec 2024 19:40 )   PT: 14.0 sec;   INR: 1.21 ratio         PTT - ( 01 Dec 2024 19:40 )  PTT:29.3 sec      Urinalysis Basic - ( 03 Dec 2024 06:43 )    Color: x / Appearance: x / SG: x / pH: x  Gluc: 106 mg/dL / Ketone: x  / Bili: x / Urobili: x   Blood: x / Protein: x / Nitrite: x   Leuk Esterase: x / RBC: x / WBC x   Sq Epi: x / Non Sq Epi: x / Bacteria: x        Culture - Blood (collected 01 Dec 2024 20:10)  Source: .Blood BLOOD  Preliminary Report (03 Dec 2024 01:02):    No growth at 24 hours    Culture - Blood (collected 01 Dec 2024 19:40)  Source: .Blood BLOOD  Preliminary Report (03 Dec 2024 01:02):    No growth at 24 hours        RADIOLOGY & ADDITIONAL TESTS:    Results Reviewed:   Imaging Personally Reviewed:  Electrocardiogram Personally Reviewed:    COORDINATION OF CARE:  Care Discussed with Consultants/Other Providers [Y/N]:  Prior or Outpatient Records Reviewed [Y/N]:

## 2024-12-03 NOTE — PROGRESS NOTE ADULT - PROBLEM SELECTOR PLAN 2
- plan as per above    #Intermittent tachycardia  - suspect secondary to infection  - plan as above    #NUNU  - iron studies c/w NUNU. Not presently menstruating.   - consider starting iron therapy once improving from infection

## 2024-12-03 NOTE — PROGRESS NOTE ADULT - SUBJECTIVE AND OBJECTIVE BOX
Interval Events:  - weaned to 2L NC  - continues to have productive cough, weakness    REVIEW OF SYSTEMS:  Negative except as documented above.      OBJECTIVE:  ICU Vital Signs Last 24 Hrs  T(C): 37.1 (03 Dec 2024 11:55), Max: 39.3 (02 Dec 2024 20:20)  T(F): 98.7 (03 Dec 2024 11:55), Max: 102.8 (02 Dec 2024 20:20)  HR: 101 (03 Dec 2024 11:55) (71 - 116)  BP: 105/68 (03 Dec 2024 11:55) (105/68 - 135/89)  BP(mean): --  ABP: --  ABP(mean): --  RR: 18 (03 Dec 2024 11:55) (18 - 36)  SpO2: 100% (03 Dec 2024 11:55) (93% - 100%)    O2 Parameters below as of 03 Dec 2024 10:48  Patient On (Oxygen Delivery Method): nasal cannula w/ humidification  O2 Flow (L/min): 3            CAPILLARY BLOOD GLUCOSE      POCT Blood Glucose.: 115 mg/dL (01 Dec 2024 19:31)      PHYSICAL EXAM:  General: NAD  HEENT:  EOMI, sclera anicteric, moist mucus membranes  Neck: supple  Cardiovascular: RRR  Respiratory: dec R base breath sounds  Abdomen: soft, nontender  Extremities: warm and well perfused, no edema, no clubbing  Skin: no rashes  Neurological: no focal deficits    HOSPITAL MEDICATIONS:  MEDICATIONS  (STANDING):  albuterol/ipratropium for Nebulization 3 milliLiter(s) Nebulizer every 6 hours  chlorhexidine 2% Cloths 1 Application(s) Topical daily  enoxaparin Injectable 40 milliGRAM(s) SubCutaneous every 24 hours  guaiFENesin ER 1200 milliGRAM(s) Oral every 12 hours  levoFLOXacin IVPB 750 milliGRAM(s) IV Intermittent every 24 hours  levoFLOXacin IVPB      melatonin 3 milliGRAM(s) Oral at bedtime  petrolatum white Ointment 1 Application(s) Topical three times a day  sodium chloride 3%  Inhalation 4 milliLiter(s) Inhalation every 12 hours    MEDICATIONS  (PRN):  acetaminophen     Tablet .. 975 milliGRAM(s) Oral every 6 hours PRN Temp greater or equal to 38C (100.4F), Mild Pain (1 - 3)  sodium chloride 0.65% Nasal 1 Spray(s) Both Nostrils every 2 hours PRN Nasal Congestion      LABS:                        11.8   10.04 )-----------( 157      ( 03 Dec 2024 06:43 )             35.8     Hgb Trend: 11.8<--, 11.5<--, 11.5<--, 12.6<--, 12.2<--  12-03    134[L]  |  99  |  8   ----------------------------<  106[H]  4.4   |  21[L]  |  0.47[L]    Ca    9.0      03 Dec 2024 06:43  Phos  3.2     12-03  Mg     2.10     12-03    TPro  6.9  /  Alb  2.9[L]  /  TBili  0.3  /  DBili  <0.2  /  AST  127[H]  /  ALT  147[H]  /  AlkPhos  51  12-02    Creatinine Trend: 0.47<--, 0.43<--, 0.45<--, 0.46<--, 0.57<--  PT/INR - ( 01 Dec 2024 19:40 )   PT: 14.0 sec;   INR: 1.21 ratio         PTT - ( 01 Dec 2024 19:40 )  PTT:29.3 sec  Urinalysis Basic - ( 03 Dec 2024 06:43 )    Color: x / Appearance: x / SG: x / pH: x  Gluc: 106 mg/dL / Ketone: x  / Bili: x / Urobili: x   Blood: x / Protein: x / Nitrite: x   Leuk Esterase: x / RBC: x / WBC x   Sq Epi: x / Non Sq Epi: x / Bacteria: x        Venous Blood Gas:  12-01 @ 19:52  7.44/36/69/24/94.1  VBG Lactate: 1.5      MICROBIOLOGY:     Culture - Blood (collected 01 Dec 2024 20:10)  Source: .Blood BLOOD  Preliminary Report (03 Dec 2024 01:02):    No growth at 24 hours    Culture - Blood (collected 01 Dec 2024 19:40)  Source: .Blood BLOOD  Preliminary Report (03 Dec 2024 01:02):    No growth at 24 hours        RADIOLOGY:  [x] Reviewed and interpreted by me

## 2024-12-03 NOTE — CHART NOTE - NSCHARTNOTEFT_GEN_A_CORE
: Maria Alejandra Kay    INDICATION: pneumonia    PROCEDURE:  [ ] LIMITED ECHO  [x] LIMITED CHEST  [ ] LIMITED RETROPERITONEAL  [ ] LIMITED ABDOMINAL  [ ] LIMITED DVT  [ ] NEEDLE GUIDANCE VASCULAR  [ ] NEEDLE GUIDANCE THORACENTESIS  [ ] NEEDLE GUIDANCE PARACENTESIS  [ ] NEEDLE GUIDANCE PERICARDIOCENTESIS  [ ] OTHER    FINDINGS:  R anterior: a line predominant  R base: consolidation, no pleural effusion  L anterior: B line predominant  L base: consolidation, no pleural effusion    INTERPRETATION:  no pleural effusion  consolidation and focal B line pattern consistent with pneumonia     Images uploaded on [employer]

## 2024-12-03 NOTE — PROGRESS NOTE ADULT - SUBJECTIVE AND OBJECTIVE BOX
24yPatient is a 24y old  Female who presents with a chief complaint of pna (03 Dec 2024 17:13)      Interval history:  febrile, on NC, still with chest pain especially with fever, + productive cough.       Allergies:   No Known Allergies    Antimicrobials:  levoFLOXacin IVPB 750 milliGRAM(s) IV Intermittent every 24 hours  levoFLOXacin IVPB          REVIEW OF SYSTEMS:  No diarrhea   No dysuria   No rash.       Vital Signs Last 24 Hrs  T(C): 37.2 (12-03-24 @ 16:00), Max: 39.3 (12-02-24 @ 20:20)  T(F): 98.9 (12-03-24 @ 16:00), Max: 102.8 (12-02-24 @ 20:20)  HR: 101 (12-03-24 @ 16:16) (71 - 116)  BP: 110/70 (12-03-24 @ 16:00) (105/68 - 135/89)  BP(mean): --  RR: 18 (12-03-24 @ 16:00) (18 - 36)  SpO2: 100% (12-03-24 @ 16:00) (93% - 100%)      PHYSICAL EXAM:  Pt in no acute distress, awake, communicative   on NC   non distended abdomen  no edema LE   no phlebitis                             11.8   10.04 )-----------( 157      ( 03 Dec 2024 06:43 )             35.8   12-03    134[L]  |  99  |  8   ----------------------------<  106[H]  4.4   |  21[L]  |  0.47[L]    Ca    9.0      03 Dec 2024 06:43  Phos  3.2     12-03  Mg     2.10     12-03    TPro  7.6  /  Alb  3.0[L]  /  TBili  0.3  /  DBili  <0.2  /  AST  135[H]  /  ALT  182[H]  /  AlkPhos  59  12-03      LIVER FUNCTIONS - ( 03 Dec 2024 06:43 )  Alb: 3.0 g/dL / Pro: 7.6 g/dL / ALK PHOS: 59 U/L / ALT: 182 U/L / AST: 135 U/L / GGT: x               Culture - Blood (collected 01 Dec 2024 20:10)  Source: .Blood BLOOD  Preliminary Report (03 Dec 2024 01:02):    No growth at 24 hours    Culture - Blood (collected 01 Dec 2024 19:40)  Source: .Blood BLOOD  Preliminary Report (03 Dec 2024 01:02):    No growth at 24 hours

## 2024-12-03 NOTE — PROVIDER CONTACT NOTE (OTHER) - RECOMMENDATIONS
Give Tylenol for fever and place ice packs on patient.
ACP made aware.
ACP notified.
ACP made aware.
Put patient on non-rebreather and order breathing treatments.

## 2024-12-03 NOTE — PROGRESS NOTE ADULT - ASSESSMENT
24 F w/ hx ovarian cyst presents to ED complaining of shortness of breath, fever x1 week. Pt admitted for sepsis w/ acute hypoxic respiratory failure 2/2 PNA    Assessment:  #Pneumonia  #Sepsis, fever, tachycardia   #Hypoxia      -Pt febrile in the ED with normal WBC. Labs significant for elevated LFTs  -CT chest showing Extensive consolidation throughout the lungs bilaterally, but most severe in the left lung. Findings are compatible with pneumonia. Very small left pleural effusion.  -RUQ US showing Hepatic steatosis without focal abnormality.  -UA neg, BCX NGTD. Urine legionella neg, Urine strep neg  -Pt currently on HFNC    Recommendation:  -c/w Levaquin 750mg Q24, can stop zosyn   -if persistent fever would obtain repeat CT Chest   -Cont to monitor fever and WBC curve  -procal noted   - pulm on board, no effusion on U/S         Linda Reed  Please contact through MS Teams   If no response or past 5 pm/weekend call [phone].

## 2024-12-03 NOTE — PROGRESS NOTE ADULT - ASSESSMENT
25 yo F with PMH ovarian cyst presents to ED with 1 week shortness of breath, cough, and fever. Found to have large L base consolidation c/w pneumonia. Pulmonary consulted for further management.     # pneumonia  # acute hypoxemic respiratory failure  - legionella antigen negative, please send legionella pcr   - strep pneumo neg  - sputum cx: normal respiratory jj   - please resend sputum cx  - hiv negative  - mrsa negative  - continue abx per ID  - wean O2 as tolerated (on 2L NC)  - incentive spirometry, oob to chair as able      Maria Alejandra Kay MD  Pulmonary and Critical Care Fellow

## 2024-12-03 NOTE — PROGRESS NOTE ADULT - PROBLEM SELECTOR PLAN 1
Pt febrile, tachy, hypoxic to 89% on RA requiring 2L NC. Likely 2/2 pna. No improvement on azithromycin outpatient. s/p IV ceftriaxone and IV doxycycline in ED. PCT 0.47 on admission.  urine legionella neg, strep neg. Sputum cx showing commensal jj, BCx 11/29 and 12/1 NGTD thus far. RVP negative x2. HIV negative. MRSA negative.   - CT Chest non contrast on 11/29 showing: extensive consolidation throughout the lungs bilaterally, most severe in L lung; findings compatible with PNA. Very small L pleural effusion.   - was previously on CTX. s/p RRT on 12/1 for tachypnea, tachycardia, worsening hypoxia and now on HFNC at 40L 40% FiO2 and abx broadened zosyn. Seen by MICU and no MICU needs.   - ID and pulm following; recs appreciated  - c/w levofloxacin (12/2 - )  - check legionella PCR, Mycoplasma IgM  - c/w pulmonary toilet: chest PT, aerobika q6, incentive spirometer, HTS 3% 4ml bid  - c/w mucinex bid, tessalon perles, and duonebs  - wean O2 as tolerated with goal >92%, OOTBC  - trend fever curve

## 2024-12-04 LAB
ANION GAP SERPL CALC-SCNC: 12 MMOL/L — SIGNIFICANT CHANGE UP (ref 7–14)
BUN SERPL-MCNC: 8 MG/DL — SIGNIFICANT CHANGE UP (ref 7–23)
CALCIUM SERPL-MCNC: 8.6 MG/DL — SIGNIFICANT CHANGE UP (ref 8.4–10.5)
CHLORIDE SERPL-SCNC: 97 MMOL/L — LOW (ref 98–107)
CO2 SERPL-SCNC: 22 MMOL/L — SIGNIFICANT CHANGE UP (ref 22–31)
CREAT SERPL-MCNC: 0.44 MG/DL — LOW (ref 0.5–1.3)
CULTURE RESULTS: SIGNIFICANT CHANGE UP
CULTURE RESULTS: SIGNIFICANT CHANGE UP
EGFR: 139 ML/MIN/1.73M2 — SIGNIFICANT CHANGE UP
GLUCOSE SERPL-MCNC: 107 MG/DL — HIGH (ref 70–99)
HCT VFR BLD CALC: 33.1 % — LOW (ref 34.5–45)
HGB BLD-MCNC: 10.6 G/DL — LOW (ref 11.5–15.5)
MAGNESIUM SERPL-MCNC: 2.2 MG/DL — SIGNIFICANT CHANGE UP (ref 1.6–2.6)
MCHC RBC-ENTMCNC: 26.6 PG — LOW (ref 27–34)
MCHC RBC-ENTMCNC: 32 G/DL — SIGNIFICANT CHANGE UP (ref 32–36)
MCV RBC AUTO: 83 FL — SIGNIFICANT CHANGE UP (ref 80–100)
NRBC # BLD: 0 /100 WBCS — SIGNIFICANT CHANGE UP (ref 0–0)
NRBC # FLD: 0 K/UL — SIGNIFICANT CHANGE UP (ref 0–0)
PHOSPHATE SERPL-MCNC: 2.7 MG/DL — SIGNIFICANT CHANGE UP (ref 2.5–4.5)
PLATELET # BLD AUTO: 217 K/UL — SIGNIFICANT CHANGE UP (ref 150–400)
POTASSIUM SERPL-MCNC: 4.9 MMOL/L — SIGNIFICANT CHANGE UP (ref 3.5–5.3)
POTASSIUM SERPL-SCNC: 4.9 MMOL/L — SIGNIFICANT CHANGE UP (ref 3.5–5.3)
RBC # BLD: 3.99 M/UL — SIGNIFICANT CHANGE UP (ref 3.8–5.2)
RBC # FLD: 12.9 % — SIGNIFICANT CHANGE UP (ref 10.3–14.5)
SODIUM SERPL-SCNC: 131 MMOL/L — LOW (ref 135–145)
SPECIMEN SOURCE: SIGNIFICANT CHANGE UP
SPECIMEN SOURCE: SIGNIFICANT CHANGE UP
WBC # BLD: 9.77 K/UL — SIGNIFICANT CHANGE UP (ref 3.8–10.5)
WBC # FLD AUTO: 9.77 K/UL — SIGNIFICANT CHANGE UP (ref 3.8–10.5)

## 2024-12-04 PROCEDURE — 99232 SBSQ HOSP IP/OBS MODERATE 35: CPT

## 2024-12-04 PROCEDURE — 99233 SBSQ HOSP IP/OBS HIGH 50: CPT | Mod: GC

## 2024-12-04 RX ORDER — LIDOCAINE 40 MG/G
1 CREAM TOPICAL DAILY
Refills: 0 | Status: DISCONTINUED | OUTPATIENT
Start: 2024-12-04 | End: 2024-12-09

## 2024-12-04 RX ORDER — 0.9 % SODIUM CHLORIDE 0.9 %
500 INTRAVENOUS SOLUTION INTRAVENOUS ONCE
Refills: 0 | Status: COMPLETED | OUTPATIENT
Start: 2024-12-04 | End: 2024-12-04

## 2024-12-04 RX ORDER — GUAIFENESIN/DEXTROMETHORPHAN 100-10MG/5
5 SYRUP ORAL EVERY 4 HOURS
Refills: 0 | Status: COMPLETED | OUTPATIENT
Start: 2024-12-04 | End: 2024-12-07

## 2024-12-04 RX ORDER — INFLUENZA VIRUS VACCINE 15; 15; 15; 15 UG/.5ML; UG/.5ML; UG/.5ML; UG/.5ML
0.5 SUSPENSION INTRAMUSCULAR ONCE
Refills: 0 | Status: DISCONTINUED | OUTPATIENT
Start: 2024-12-04 | End: 2024-12-09

## 2024-12-04 RX ORDER — IRON SUCROSE 20 MG/ML
200 INJECTION, SOLUTION INTRAVENOUS ONCE
Refills: 0 | Status: COMPLETED | OUTPATIENT
Start: 2024-12-04 | End: 2024-12-04

## 2024-12-04 RX ADMIN — PETROLATUM 1 APPLICATION(S): 960 OINTMENT TOPICAL at 15:22

## 2024-12-04 RX ADMIN — SODIUM CHLORIDE 4 MILLILITER(S): 9 INJECTION, SOLUTION INTRAMUSCULAR; INTRAVENOUS; SUBCUTANEOUS at 20:35

## 2024-12-04 RX ADMIN — Medication 1 SPRAY(S): at 21:29

## 2024-12-04 RX ADMIN — Medication 5 MILLILITER(S): at 21:26

## 2024-12-04 RX ADMIN — ACETAMINOPHEN, DIPHENHYDRAMINE HCL, PHENYLEPHRINE HCL 3 MILLIGRAM(S): 325; 25; 5 TABLET ORAL at 21:24

## 2024-12-04 RX ADMIN — IPRATROPIUM BROMIDE AND ALBUTEROL SULFATE 3 MILLILITER(S): 2.5; .5 SOLUTION RESPIRATORY (INHALATION) at 08:19

## 2024-12-04 RX ADMIN — ENOXAPARIN SODIUM 40 MILLIGRAM(S): 30 INJECTION SUBCUTANEOUS at 21:21

## 2024-12-04 RX ADMIN — PETROLATUM 1 APPLICATION(S): 960 OINTMENT TOPICAL at 21:24

## 2024-12-04 RX ADMIN — Medication 5 MILLILITER(S): at 15:17

## 2024-12-04 RX ADMIN — Medication 1200 MILLIGRAM(S): at 00:52

## 2024-12-04 RX ADMIN — PETROLATUM 1 APPLICATION(S): 960 OINTMENT TOPICAL at 05:43

## 2024-12-04 RX ADMIN — Medication 250 MILLILITER(S): at 21:21

## 2024-12-04 RX ADMIN — Medication 5 MILLILITER(S): at 17:19

## 2024-12-04 RX ADMIN — IPRATROPIUM BROMIDE AND ALBUTEROL SULFATE 3 MILLILITER(S): 2.5; .5 SOLUTION RESPIRATORY (INHALATION) at 03:13

## 2024-12-04 RX ADMIN — ACETAMINOPHEN 500MG 975 MILLIGRAM(S): 500 TABLET, COATED ORAL at 18:26

## 2024-12-04 RX ADMIN — IRON SUCROSE 110 MILLIGRAM(S): 20 INJECTION, SOLUTION INTRAVENOUS at 17:19

## 2024-12-04 RX ADMIN — CHLORHEXIDINE GLUCONATE 1 APPLICATION(S): 1.2 RINSE ORAL at 15:21

## 2024-12-04 RX ADMIN — SODIUM CHLORIDE 4 MILLILITER(S): 9 INJECTION, SOLUTION INTRAMUSCULAR; INTRAVENOUS; SUBCUTANEOUS at 08:19

## 2024-12-04 RX ADMIN — ACETAMINOPHEN 500MG 975 MILLIGRAM(S): 500 TABLET, COATED ORAL at 08:26

## 2024-12-04 NOTE — PROGRESS NOTE ADULT - SUBJECTIVE AND OBJECTIVE BOX
24yPatient is a 24y old  Female who presents with a chief complaint of pna (04 Dec 2024 13:19)      Interval history:  Low grade temp, + cough, chest pain when coughing, on 3L.       Allergies:   No Known Allergies      Antimicrobials:  levoFLOXacin IVPB      levoFLOXacin IVPB 750 milliGRAM(s) IV Intermittent every 24 hours      REVIEW OF SYSTEMS:  No abdominal pain  No dysuria   No rash.       Vital Signs Last 24 Hrs  T(C): 37 (12-04-24 @ 21:13), Max: 37.8 (12-04-24 @ 05:15)  T(F): 98.6 (12-04-24 @ 21:13), Max: 100 (12-04-24 @ 05:15)  HR: 101 (12-04-24 @ 21:13) (95 - 112)  BP: 124/78 (12-04-24 @ 21:13) (115/70 - 124/78)  BP(mean): --  RR: 18 (12-04-24 @ 21:13) (16 - 18)  SpO2: 96% (12-04-24 @ 21:13) (96% - 100%)      PHYSICAL EXAM:  Pt in no acute distress, awake, communicative   on NC   non distended abdomen  no edema LE   no phlebitis                               10.6   9.77  )-----------( 217      ( 04 Dec 2024 06:30 )             33.1   12-04    131[L]  |  97[L]  |  8   ----------------------------<  107[H]  4.9   |  22  |  0.44[L]    Ca    8.6      04 Dec 2024 06:30  Phos  2.7     12-04  Mg     2.20     12-04    TPro  7.6  /  Alb  3.0[L]  /  TBili  0.3  /  DBili  <0.2  /  AST  135[H]  /  ALT  182[H]  /  AlkPhos  59  12-03      LIVER FUNCTIONS - ( 03 Dec 2024 06:43 )  Alb: 3.0 g/dL / Pro: 7.6 g/dL / ALK PHOS: 59 U/L / ALT: 182 U/L / AST: 135 U/L / GGT: x

## 2024-12-04 NOTE — PROGRESS NOTE ADULT - ASSESSMENT
23 yo F with PMH ovarian cyst presents to ED with 1 week shortness of breath, cough, and fever. Found to have large L base consolidation c/w pneumonia. Pulmonary consulted for further management.     # pneumonia  # acute hypoxemic respiratory failure  - legionella antigen negative, please send legionella pcr   - strep pneumo neg  - sputum cx: normal respiratory jj   - please resend sputum cx  - hiv negative  - mrsa negative  - continue abx per ID  - wean O2 as tolerated (on 2L NC)  - incentive spirometry, oob to chair as able  - please provide with aerobika for airway clearance      Maria Alejandra Kay MD  Pulmonary and Critical Care Fellow 25 yo F with PMH ovarian cyst presents to ED with 1 week shortness of breath, cough, and fever. Found to have large L base consolidation c/w pneumonia. Pulmonary consulted for further management.     # pneumonia  # acute hypoxemic respiratory failure  Fever curve improving, O2 requirements improving -- course not unexpected for degree of pneumonia on CT chest.   - legionella antigen negative, f/u legionella pcr   - strep pneumo neg  - sputum cx: normal respiratory jj   - please resend sputum cx  - hiv negative  - mrsa negative  - continue abx per ID  - wean O2 as tolerated (on 2-3L NC)  - incentive spirometry, oob to chair as able  - please provide with aerobika for airway clearance      Maria Alejandra Kay MD  Pulmonary and Critical Care Fellow

## 2024-12-04 NOTE — PROGRESS NOTE ADULT - PROBLEM SELECTOR PLAN 1
Pt febrile, tachy, hypoxic to 89% on RA requiring 2L NC. Likely 2/2 pna. No improvement on azithromycin outpatient. s/p IV ceftriaxone and IV doxycycline in ED. PCT 0.47 on admission.  urine legionella neg, strep neg. Sputum cx showing commensal jj, BCx 11/29 and 12/1 NGTD thus far. RVP negative x2. HIV negative. MRSA negative.   - CT Chest non contrast on 11/29 showing: extensive consolidation throughout the lungs bilaterally, most severe in L lung; findings compatible with PNA. Very small L pleural effusion.   - was previously on CTX. s/p RRT on 12/1 for tachypnea, tachycardia, worsening hypoxia and now on HFNC at 40L 40% FiO2 and abx broadened zosyn. Seen by MICU and no MICU needs.   - ID and pulm following; recs appreciated  - c/w levofloxacin (12/2 - )  - f/u legionella PCR, Mycoplasma IgM  - c/w pulmonary toilet: chest PT, aerobika q6, incentive spirometer, HTS 3% 4ml bid  - s/w robitussin for cough   - c/w mucinex bid, tessalon perles, and duonebs  - wean O2 as tolerated with goal >92%, OOTBC  - trend fever curve

## 2024-12-04 NOTE — PROGRESS NOTE ADULT - SUBJECTIVE AND OBJECTIVE BOX
[facility] Department of Hospital Medicine  Washington Lamas DO  Available on MS Teams  Pager: [phone]    Patient is a 24y old  Female who presents with a chief complaint of pna (04 Dec 2024 09:20)      OVERNIGHT EVENTS: No acute events overnight    SUBJECTIVE: Pt seen and examined at bedside this morning. On 3 L NC this morning. Still reporting some dizziness/lightheadedness on walking/moving out of bed, appetite low but similar otherwise. +cough with at times green sputum production. Pending orthostatics. Sinus tach on tele noted at times. some pain in ribs with coughing. Robitussin added for cough relief with aerobika.     ADDITIONAL REVIEW OF SYSTEMS:    MEDICATIONS  (STANDING):  chlorhexidine 2% Cloths 1 Application(s) Topical daily  enoxaparin Injectable 40 milliGRAM(s) SubCutaneous every 24 hours  levoFLOXacin IVPB 750 milliGRAM(s) IV Intermittent every 24 hours  levoFLOXacin IVPB      lidocaine   4% Patch 1 Patch Transdermal daily  melatonin 3 milliGRAM(s) Oral at bedtime  petrolatum white Ointment 1 Application(s) Topical three times a day  sodium chloride 3%  Inhalation 4 milliLiter(s) Inhalation every 12 hours    MEDICATIONS  (PRN):  acetaminophen     Tablet .. 975 milliGRAM(s) Oral every 6 hours PRN Temp greater or equal to 38C (100.4F), Mild Pain (1 - 3)  sodium chloride 0.65% Nasal 1 Spray(s) Both Nostrils every 2 hours PRN Nasal Congestion      CAPILLARY BLOOD GLUCOSE        I&O's Summary      PHYSICAL EXAM:  Vital Signs Last 24 Hrs  T(C): 37.2 (04 Dec 2024 09:00), Max: 38.8 (03 Dec 2024 14:57)  T(F): 99 (04 Dec 2024 09:00), Max: 101.8 (03 Dec 2024 14:57)  HR: 112 (04 Dec 2024 09:00) (95 - 116)  BP: 120/77 (04 Dec 2024 09:00) (110/70 - 139/92)  BP(mean): --  RR: 18 (04 Dec 2024 09:00) (16 - 19)  SpO2: 100% (04 Dec 2024 09:00) (97% - 100%)    Parameters below as of 04 Dec 2024 09:00  Patient On (Oxygen Delivery Method): nasal cannula  O2 Flow (L/min): 3      Physical Exam  CONSTITUTIONAL: NAD, well-developed, resting, talking in full sentences without accessory mm use  HEENT: clear conjunctiva, PEERLA, EOMI, no rhinorrhea, no pharyngeal erythema, no cervical LAD appreciated.   RESPIRATORY: rhonchi at bases with very mild exp wheezing in anterior lung fields   CARDIOVASCULAR: RRR, S1 and S2 present, no m/r/g  ABDOMEN: soft, NT, ND, bowel sounds present  EXTREMITIES: No LE edema b/l   MUSCULOSKELETAL: no joint swelling, no tenderness to palpation  NEURO: A&Ox3, moving all extremities independently.   Skin: warm and intact      LABS:                        10.6   9.77  )-----------( 217      ( 04 Dec 2024 06:30 )             33.1     12-04    131[L]  |  97[L]  |  8   ----------------------------<  107[H]  4.9   |  22  |  0.44[L]    Ca    8.6      04 Dec 2024 06:30  Phos  2.7     12-04  Mg     2.20     12-04    TPro  7.6  /  Alb  3.0[L]  /  TBili  0.3  /  DBili  <0.2  /  AST  135[H]  /  ALT  182[H]  /  AlkPhos  59  12-03          Urinalysis Basic - ( 04 Dec 2024 06:30 )    Color: x / Appearance: x / SG: x / pH: x  Gluc: 107 mg/dL / Ketone: x  / Bili: x / Urobili: x   Blood: x / Protein: x / Nitrite: x   Leuk Esterase: x / RBC: x / WBC x   Sq Epi: x / Non Sq Epi: x / Bacteria: x        Culture - Blood (collected 01 Dec 2024 20:10)  Source: .Blood BLOOD  Preliminary Report (04 Dec 2024 01:02):    No growth at 48 Hours    Culture - Blood (collected 01 Dec 2024 19:40)  Source: .Blood BLOOD  Preliminary Report (04 Dec 2024 01:02):    No growth at 48 Hours        RADIOLOGY & ADDITIONAL TESTS:    Results Reviewed:   Imaging Personally Reviewed:  Electrocardiogram Personally Reviewed:    COORDINATION OF CARE:  Care Discussed with Consultants/Other Providers [Y/N]:  Prior or Outpatient Records Reviewed [Y/N]:

## 2024-12-04 NOTE — PROGRESS NOTE ADULT - ASSESSMENT
24 F w/ hx ovarian cyst presents to ED complaining of shortness of breath, fever x1 week. Pt admitted for sepsis w/ acute hypoxic respiratory failure 2/2 PNA    Assessment:  #Pneumonia  #Sepsis, fever, tachycardia   #Hypoxia      -Pt febrile in the ED with normal WBC. Labs significant for elevated LFTs  -CT chest showing Extensive consolidation throughout the lungs bilaterally, but most severe in the left lung. Findings are compatible with pneumonia. Very small left pleural effusion.  -RUQ US showing Hepatic steatosis without focal abnormality.  -UA neg, BCX NGTD. Urine legionella neg, Urine strep neg  -Pt currently on NC    Recommendation:  -c/w Levaquin 750mg Q24,   -if persistent fever would obtain repeat CT Chest   -Cont to monitor fever and WBC curve  -procal noted   - pulm on board, no effusion on U/S   - incentive spirometer.         Linda Reed  Please contact through MS Teams   If no response or past 5 pm/weekend call [phone].

## 2024-12-04 NOTE — PROGRESS NOTE ADULT - SUBJECTIVE AND OBJECTIVE BOX
Interval Events:      REVIEW OF SYSTEMS:  Negative except as documented above.      OBJECTIVE:  ICU Vital Signs Last 24 Hrs  T(C): 37.2 (04 Dec 2024 09:00), Max: 38.8 (03 Dec 2024 14:57)  T(F): 99 (04 Dec 2024 09:00), Max: 101.8 (03 Dec 2024 14:57)  HR: 112 (04 Dec 2024 09:00) (95 - 116)  BP: 120/77 (04 Dec 2024 09:00) (105/68 - 139/92)  BP(mean): --  ABP: --  ABP(mean): --  RR: 18 (04 Dec 2024 09:00) (16 - 19)  SpO2: 100% (04 Dec 2024 09:00) (93% - 100%)    O2 Parameters below as of 04 Dec 2024 09:00  Patient On (Oxygen Delivery Method): nasal cannula  O2 Flow (L/min): 3            CAPILLARY BLOOD GLUCOSE          PHYSICAL EXAM:  General: NAD  HEENT:  EOMI, sclera anicteric, moist mucus membranes  Neck: supple  Cardiovascular: RRR  Respiratory: CTAB, no wheezes, crackles, or rhonci  Abdomen: soft, nontender  Extremities: warm and well perfused, no edema, no clubbing  Skin: no rashes  Neurological: no focal deficits    HOSPITAL MEDICATIONS:  MEDICATIONS  (STANDING):  chlorhexidine 2% Cloths 1 Application(s) Topical daily  enoxaparin Injectable 40 milliGRAM(s) SubCutaneous every 24 hours  levoFLOXacin IVPB 750 milliGRAM(s) IV Intermittent every 24 hours  levoFLOXacin IVPB      melatonin 3 milliGRAM(s) Oral at bedtime  petrolatum white Ointment 1 Application(s) Topical three times a day  sodium chloride 3%  Inhalation 4 milliLiter(s) Inhalation every 12 hours    MEDICATIONS  (PRN):  acetaminophen     Tablet .. 975 milliGRAM(s) Oral every 6 hours PRN Temp greater or equal to 38C (100.4F), Mild Pain (1 - 3)  sodium chloride 0.65% Nasal 1 Spray(s) Both Nostrils every 2 hours PRN Nasal Congestion      LABS:                        11.8   10.04 )-----------( 157      ( 03 Dec 2024 06:43 )             35.8     Hgb Trend: 11.8<--, 11.5<--, 11.5<--, 12.6<--, 12.2<--  12-04    131[L]  |  97[L]  |  8   ----------------------------<  107[H]  4.9   |  22  |  0.44[L]    Ca    8.6      04 Dec 2024 06:30  Phos  2.7     12-04  Mg     2.20     12-04    TPro  7.6  /  Alb  3.0[L]  /  TBili  0.3  /  DBili  <0.2  /  AST  135[H]  /  ALT  182[H]  /  AlkPhos  59  12-03    Creatinine Trend: 0.44<--, 0.47<--, 0.43<--, 0.45<--, 0.46<--, 0.57<--    Urinalysis Basic - ( 04 Dec 2024 06:30 )    Color: x / Appearance: x / SG: x / pH: x  Gluc: 107 mg/dL / Ketone: x  / Bili: x / Urobili: x   Blood: x / Protein: x / Nitrite: x   Leuk Esterase: x / RBC: x / WBC x   Sq Epi: x / Non Sq Epi: x / Bacteria: x            MICROBIOLOGY:     Culture - Blood (collected 01 Dec 2024 20:10)  Source: .Blood BLOOD  Preliminary Report (04 Dec 2024 01:02):    No growth at 48 Hours    Culture - Blood (collected 01 Dec 2024 19:40)  Source: .Blood BLOOD  Preliminary Report (04 Dec 2024 01:02):    No growth at 48 Hours        RADIOLOGY:  [x] Reviewed and interpreted by me

## 2024-12-04 NOTE — PROGRESS NOTE ADULT - PROBLEM SELECTOR PLAN 2
- plan as per above    #Intermittent tachycardia  - suspect secondary to infection  - plan as above    #NUNU  - iron studies c/w NUNU. Not presently menstruating.   - given presentation and sx of weakness, with slight downward Hb trend, favor dose of iv iron - plan as per above    #Intermittent tachycardia  - suspect secondary to infection  - check orthostatics  - plan as above    #NUNU  - iron studies c/w NUNU. Not presently menstruating.   - given presentation and sx of weakness, with slight downward Hb trend, favor dose of iv iron

## 2024-12-05 LAB
ADD ON TEST-SPECIMEN IN LAB: SIGNIFICANT CHANGE UP
ALBUMIN SERPL ELPH-MCNC: 3.2 G/DL — LOW (ref 3.3–5)
ALP SERPL-CCNC: 58 U/L — SIGNIFICANT CHANGE UP (ref 40–120)
ALT FLD-CCNC: 139 U/L — HIGH (ref 4–33)
ANION GAP SERPL CALC-SCNC: 13 MMOL/L — SIGNIFICANT CHANGE UP (ref 7–14)
AST SERPL-CCNC: 81 U/L — HIGH (ref 4–32)
BILIRUB DIRECT SERPL-MCNC: <0.2 MG/DL — SIGNIFICANT CHANGE UP (ref 0–0.3)
BILIRUB INDIRECT FLD-MCNC: >0.1 MG/DL — SIGNIFICANT CHANGE UP (ref 0–1)
BILIRUB SERPL-MCNC: 0.3 MG/DL — SIGNIFICANT CHANGE UP (ref 0.2–1.2)
BUN SERPL-MCNC: 9 MG/DL — SIGNIFICANT CHANGE UP (ref 7–23)
CALCIUM SERPL-MCNC: 9.2 MG/DL — SIGNIFICANT CHANGE UP (ref 8.4–10.5)
CHLORIDE SERPL-SCNC: 98 MMOL/L — SIGNIFICANT CHANGE UP (ref 98–107)
CO2 SERPL-SCNC: 22 MMOL/L — SIGNIFICANT CHANGE UP (ref 22–31)
CREAT SERPL-MCNC: 0.42 MG/DL — LOW (ref 0.5–1.3)
EGFR: 141 ML/MIN/1.73M2 — SIGNIFICANT CHANGE UP
GLUCOSE SERPL-MCNC: 95 MG/DL — SIGNIFICANT CHANGE UP (ref 70–99)
GRAM STN FLD: SIGNIFICANT CHANGE UP
HCT VFR BLD CALC: 37 % — SIGNIFICANT CHANGE UP (ref 34.5–45)
HGB BLD-MCNC: 12.2 G/DL — SIGNIFICANT CHANGE UP (ref 11.5–15.5)
M PNEUMO IGM SER-ACNC: 3.64 INDEX — HIGH (ref 0–0.9)
MAGNESIUM SERPL-MCNC: 2.3 MG/DL — SIGNIFICANT CHANGE UP (ref 1.6–2.6)
MCHC RBC-ENTMCNC: 33 G/DL — SIGNIFICANT CHANGE UP (ref 32–36)
MCHC RBC-ENTMCNC: 42.7 PG — HIGH (ref 27–34)
MCV RBC AUTO: 86.4 FL — SIGNIFICANT CHANGE UP (ref 80–100)
MYCOPLASMA AG SPEC QL: POSITIVE
NRBC # BLD: 0 /100 WBCS — SIGNIFICANT CHANGE UP (ref 0–0)
NRBC # FLD: 0 K/UL — SIGNIFICANT CHANGE UP (ref 0–0)
OSMOLALITY SERPL: 297 MOSM/KG — HIGH (ref 275–295)
PHOSPHATE SERPL-MCNC: 2.7 MG/DL — SIGNIFICANT CHANGE UP (ref 2.5–4.5)
PLATELET # BLD AUTO: 258 K/UL — SIGNIFICANT CHANGE UP (ref 150–400)
POTASSIUM SERPL-MCNC: 5.1 MMOL/L — SIGNIFICANT CHANGE UP (ref 3.5–5.3)
POTASSIUM SERPL-SCNC: 5.1 MMOL/L — SIGNIFICANT CHANGE UP (ref 3.5–5.3)
PROT SERPL-MCNC: 7.5 G/DL — SIGNIFICANT CHANGE UP (ref 6–8.3)
RBC # BLD: 2.86 M/UL — LOW (ref 3.8–5.2)
RBC # FLD: 13.2 % — SIGNIFICANT CHANGE UP (ref 10.3–14.5)
SODIUM SERPL-SCNC: 133 MMOL/L — LOW (ref 135–145)
SPECIMEN SOURCE: SIGNIFICANT CHANGE UP
WBC # BLD: 7.49 K/UL — SIGNIFICANT CHANGE UP (ref 3.8–10.5)
WBC # FLD AUTO: 7.49 K/UL — SIGNIFICANT CHANGE UP (ref 3.8–10.5)

## 2024-12-05 PROCEDURE — 99233 SBSQ HOSP IP/OBS HIGH 50: CPT | Mod: GC

## 2024-12-05 PROCEDURE — 99232 SBSQ HOSP IP/OBS MODERATE 35: CPT

## 2024-12-05 RX ORDER — IRON SUCROSE 20 MG/ML
200 INJECTION, SOLUTION INTRAVENOUS
Refills: 0 | Status: COMPLETED | OUTPATIENT
Start: 2024-12-05 | End: 2024-12-06

## 2024-12-05 RX ADMIN — ENOXAPARIN SODIUM 40 MILLIGRAM(S): 30 INJECTION SUBCUTANEOUS at 21:54

## 2024-12-05 RX ADMIN — Medication 5 MILLILITER(S): at 21:54

## 2024-12-05 RX ADMIN — ACETAMINOPHEN, DIPHENHYDRAMINE HCL, PHENYLEPHRINE HCL 3 MILLIGRAM(S): 325; 25; 5 TABLET ORAL at 21:54

## 2024-12-05 RX ADMIN — Medication 5 MILLILITER(S): at 16:09

## 2024-12-05 RX ADMIN — Medication 5 MILLILITER(S): at 10:08

## 2024-12-05 RX ADMIN — PETROLATUM 1 APPLICATION(S): 960 OINTMENT TOPICAL at 05:11

## 2024-12-05 RX ADMIN — Medication 5 MILLILITER(S): at 05:11

## 2024-12-05 RX ADMIN — LIDOCAINE 1 PATCH: 40 CREAM TOPICAL at 12:50

## 2024-12-05 RX ADMIN — IRON SUCROSE 110 MILLIGRAM(S): 20 INJECTION, SOLUTION INTRAVENOUS at 17:14

## 2024-12-05 RX ADMIN — PETROLATUM 1 APPLICATION(S): 960 OINTMENT TOPICAL at 21:54

## 2024-12-05 RX ADMIN — PETROLATUM 1 APPLICATION(S): 960 OINTMENT TOPICAL at 14:11

## 2024-12-05 RX ADMIN — Medication 5 MILLILITER(S): at 01:52

## 2024-12-05 RX ADMIN — CHLORHEXIDINE GLUCONATE 1 APPLICATION(S): 1.2 RINSE ORAL at 12:51

## 2024-12-05 NOTE — PHYSICAL THERAPY INITIAL EVALUATION ADULT - LEVEL OF INDEPENDENCE: SIT/STAND, REHAB EVAL
deferred secondary to dizziness; pt requested to return to semisupine position. PANTERA Eubanks at bedside and aware.

## 2024-12-05 NOTE — PHYSICAL THERAPY INITIAL EVALUATION ADULT - ADL SKILLS, REHAB EVAL
Patients daughter- in- law Keyla called and Angie Caldwell,([facility]) does not think patient can handle doing her Suprep at home for her Colonoscopy, please call Angie to discuss, she will only be working today she will be off the rest of the week.   independent

## 2024-12-05 NOTE — PHYSICAL THERAPY INITIAL EVALUATION ADULT - ADDITIONAL COMMENTS
Pt left semisupine in bed in NAD, all lines intact, call bell in reach, SPO2 95%, +NC at 3L, bed alarm on, and RN Cha aware.

## 2024-12-05 NOTE — PROGRESS NOTE ADULT - ASSESSMENT
Pt is a 24 F w/ hx ovarian cyst presents to ED complaining of shortness of breath, fever x1 week. Pt admitted for sepsis w/ acute hypoxic respiratory failure 2/2 pna. Hospital course notable worsening hypoxia, tachypnea, with RRT called on 12/1 and broadening of abx. Seen by MICU then and no MICU needs. Pulm and ID following for further recs. Also with transaminitis, RUQ showing hepatic steatosis. Mycoplasma IgM +.

## 2024-12-05 NOTE — PHYSICAL THERAPY INITIAL EVALUATION ADULT - PERTINENT HX OF CURRENT PROBLEM, REHAB EVAL
Patient is a 24 year old Female, PMH stated below, presents with complaints of SOB, fever x 1 week; sepsis with acute hypoxic respiratory failure secondary to PNA.

## 2024-12-05 NOTE — PHYSICAL THERAPY INITIAL EVALUATION ADULT - PRECAUTIONS/LIMITATIONS, REHAB EVAL
Saige Donahue is a 38 year old woman who presents today to discuss her upcoming surgery.  She will be undergoing a Robotic Hysterectomy.  Her workup included:  Last Pap:  3/21: atypical with hpv  Endometrial Bx: She is aware that a tissue diagnosis is needed to rule out endometrial hyperplasia.  At this point, she is comfortable doing the biopsy at the time of surgery.  She is aware that depending on the results she may need further surgery and possibly referral to a specialist.  U/s:  5cm with adeno    OB/GYN HISTORY:    OB History    Para Term  AB Living   6 6 6 0 0 6   SAB TAB Ectopic Molar Multiple Live Births   0 0 0 0 0 6   Obstetric Comments   All boys 18,13,11,7 yo    Patient's last menstrual period was 2021 (approximate).  PAST MEDICAL HISTORY:    Past Medical History:   Diagnosis Date   • Abnormal weight gain     30 lb weight gain- PCOS   • Anemia     PP hemorrhage   • Anxiety     , no meds; did see counselor   • Cervical dysplasia    • chronic HA    • DUB (dysfunctional uterine bleeding)     managed with Depo Provera   • Gestational diabetes     4th pregnancy   • Heart palpitations     Dr West Piper- cardiologist; Dr Anderson Quintana- Electrophysiologist   • High risk HPV infection complicating pregnancy, antepartum 2017   • Migraine with aura     improved with inner ear piercing.   • Mild asthma    • PCOS (polycystic ovarian syndrome)     metformin   • Post partum depression     after 3rd son; was sick infant RSV- situational   • Postpartum hemorrhage    • Vaginal delivery     , , ,      PAST SURGICAL HISTORY:    Past Surgical History:   Procedure Laterality Date   • Colposcopy     • D and c  2006    DUB   • Salpingectomy  2020   • Wiley tooth extraction       SOCIAL HISTORY:    Social History     Socioeconomic History   • Marital status: Single     Spouse name: Not on file   • Number of children: 5   • Years of education: Not  on file   • Highest education level: Not on file   Occupational History     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   Tobacco Use   • Smoking status: Former Smoker     Packs/day: 0.00   • Smokeless tobacco: Former User     Quit date: 1/1/2010   Substance and Sexual Activity   • Alcohol use: No     Alcohol/week: 0.0 standard drinks     Comment: rare   • Drug use: No   • Sexual activity: Not Currently     Partners: Male     Birth control/protection: None, Injection     Comment: Depo Provera   Other Topics Concern   • Not on file   Social History Narrative   • Not on file     Social Determinants of Health     Financial Resource Strain: Not on file   Food Insecurity: Not on file   Transportation Needs:    • Lack of Transportation (Medical):    • Lack of Transportation (Non-Medical):    Physical Activity:    • Days of Exercise per Week:    • Minutes of Exercise per Session:    Stress: Not on file   Social Connections:    • Social Determinants: Social Connections:    Intimate Partner Violence: Not At Risk   • Social Determinants: Intimate Partner Violence Past Fear: 1   • Social Determinants: Intimate Partner Violence Current Fear: 1     Review of patient's social economics indicates:                    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      FAMILY HISTORY:    Family History   Problem Relation Age of Onset   • Heart Mother         MI at age 40 MOMS ADOPTED   • NEGATIVE FAMILY HX OF Father         doesn't routinely go to MD   • Dementia/Alzheimers Paternal Grandmother    • Other Paternal Grandfather         alzheimersss   • Heart Sister         POTS   • Patient is unaware of any medical problems Sister    • Patient is unaware of any medical problems Brother    • Patient is unaware of any medical problems Brother    • Patient is unaware of any medical problems Son    • Patient is unaware of any medical problems Son    • Patient is unaware of any medical problems Son    • Patient is unaware of any medical problems Son    •  Cancer, Breast Other         maternal great-aunts   • Cancer Maternal Uncle         colon   • Cancer Maternal Uncle         ? passed   • Cancer, Lung Maternal Aunt    • Patient is unaware of any medical problems Son    • Patient is unaware of any medical problems Maternal Grandmother    • Patient is unaware of any medical problems Maternal Grandfather        OBJECTIVE:  VITALS:    Visit Vitals  /62   Wt 66 kg   LMP 02/28/2021 (Approximate) Comment: taking b/c pills   BMI 26.63 kg/m²      GENERAL:  Alert & Oriented x3, mood and affect are appropriate.     IMPRESSION:  >Indication:  adenomyosis    PLAN:  > We reviewed the upcoming procedure in detail.  The indications for the procedure as well as other options were once again reviewed.  Pt wishes to proceed with the procedure.  We discussed potential risks of surgery including bleeding, infection, damage to abdominal organs (specifically bladder, rectum, bowels, ureters), risk of subsequent scar tissue.  We then talked about expectations for both the immediate and long term postoperative period.  All of Saieg's concerns were addressed and questions were answered. We then discussed what should be done with the ovaries.  We discussed if the ovaries are left in there is the possibility of needing another operation if a cyst were to form.  We then talked about the risk of ovarian cancer, approximately 1/90.  We reviewed her family history to see if she was at all at increased risk.  On the other hand, we discussed the benefits of estrogen for both heart and bone health.  We talked about expectations for surgical menopause.  All of Saige 's questions were answered and she will not proceed with the BSO.  We also discussed possible complications which would require morcellation of the uterus.  The risks of future cancer were discussed and pt feels comfortable proceeding with morcellation over a mini-lap.  She had no other questions regarding surgery at this time.   I encouraged Saige to contact me at any time with any questions or concerns regarding surgery.    Greater than 50% of the visit was spent counseling regarding above issues; total time spent 21 minutes.      PCP:  Estephania Bruner MD          fall precautions

## 2024-12-05 NOTE — PROGRESS NOTE ADULT - PROBLEM SELECTOR PLAN 2
- plan as per above    #Intermittent tachycardia  - suspect secondary to infection  - orthostatics negative  - plan as above    #NUNU  - iron studies c/w NUNU. Not presently menstruating.   - given presentation and sx of weakness, with slight downward Hb trend, favor iv iron

## 2024-12-05 NOTE — PROGRESS NOTE ADULT - SUBJECTIVE AND OBJECTIVE BOX
[facility] Department of Hospital Medicine  Washington Lamas DO  Available on MS Teams  Pager: [phone]    Patient is a 24y old  Female who presents with a chief complaint of pna (05 Dec 2024 08:07)      OVERNIGHT EVENTS: No acute events overnight    SUBJECTIVE: Pt seen and examined at bedside this morning. On 1 L Nc currently, mother at bedside. Feeling better slightly, appears less sick appearing. +improvement in cough with less sputum production. Feeling weak overall tho and still with low appetite and with some lightheadedness on standing/ambulation. Orthostatic negative. Mycoplasma IgM+. Updated mother on POC at bedside. PT eval. Bcx = NGTD.     Tele: intermittent sinus tachycardia as well     ADDITIONAL REVIEW OF SYSTEMS:    MEDICATIONS  (STANDING):  chlorhexidine 2% Cloths 1 Application(s) Topical daily  enoxaparin Injectable 40 milliGRAM(s) SubCutaneous every 24 hours  guaifenesin/dextromethorphan Oral Liquid 5 milliLiter(s) Oral every 4 hours  influenza   Vaccine 0.5 milliLiter(s) IntraMuscular once  levoFLOXacin IVPB      levoFLOXacin IVPB 750 milliGRAM(s) IV Intermittent every 24 hours  lidocaine   4% Patch 1 Patch Transdermal daily  melatonin 3 milliGRAM(s) Oral at bedtime  petrolatum white Ointment 1 Application(s) Topical three times a day    MEDICATIONS  (PRN):  acetaminophen     Tablet .. 975 milliGRAM(s) Oral every 6 hours PRN Temp greater or equal to 38C (100.4F), Mild Pain (1 - 3)  sodium chloride 0.65% Nasal 1 Spray(s) Both Nostrils every 2 hours PRN Nasal Congestion      CAPILLARY BLOOD GLUCOSE        I&O's Summary      PHYSICAL EXAM:  Vital Signs Last 24 Hrs  T(C): 36.7 (05 Dec 2024 13:00), Max: 37.7 (04 Dec 2024 18:26)  T(F): 98 (05 Dec 2024 13:00), Max: 99.8 (04 Dec 2024 18:26)  HR: 105 (05 Dec 2024 13:00) (94 - 119)  BP: 125/80 (05 Dec 2024 13:00) (115/76 - 141/93)  BP(mean): --  RR: 18 (05 Dec 2024 13:00) (18 - 19)  SpO2: 96% (05 Dec 2024 13:29) (92% - 100%)    Parameters below as of 05 Dec 2024 13:29  Patient On (Oxygen Delivery Method): nasal cannula  O2 Flow (L/min): 3    Physical Exam  CONSTITUTIONAL: NAD, well-developed, resting, talking in full sentences without accessory mm use  HEENT: clear conjunctiva, PEERLA, EOMI, no rhinorrhea, no pharyngeal erythema, no cervical LAD appreciated.   RESPIRATORY: improved and now CTA b/l   CARDIOVASCULAR: tachycardic, S1 and S2 present, no m/r/g  ABDOMEN: soft, NT, ND, bowel sounds present  EXTREMITIES: No LE edema b/l   MUSCULOSKELETAL: no joint swelling, no tenderness to palpation  NEURO: A&Ox3, moving all extremities independently.   Skin: warm and intact        LABS:                        12.2   7.49  )-----------( 258      ( 05 Dec 2024 07:35 )             37.0     12-05    133[L]  |  98  |  9   ----------------------------<  95  5.1   |  22  |  0.42[L]    Ca    9.2      05 Dec 2024 07:35  Phos  2.7     12-05  Mg     2.30     12-05            Urinalysis Basic - ( 05 Dec 2024 07:35 )    Color: x / Appearance: x / SG: x / pH: x  Gluc: 95 mg/dL / Ketone: x  / Bili: x / Urobili: x   Blood: x / Protein: x / Nitrite: x   Leuk Esterase: x / RBC: x / WBC x   Sq Epi: x / Non Sq Epi: x / Bacteria: x        Culture - Sputum (collected 04 Dec 2024 17:17)  Source: .Sputum Sputum  Gram Stain (05 Dec 2024 00:10):    No polymorphonuclear leukocytes per low power field    No Squamous epithelial cells per low power field    No organisms seen per oil power field        RADIOLOGY & ADDITIONAL TESTS:    Results Reviewed:   Imaging Personally Reviewed:  Electrocardiogram Personally Reviewed:    COORDINATION OF CARE:  Care Discussed with Consultants/Other Providers [Y/N]:  Prior or Outpatient Records Reviewed [Y/N]:

## 2024-12-05 NOTE — PROGRESS NOTE ADULT - PROBLEM SELECTOR PLAN 1
Pt febrile, tachy, hypoxic to 89% on RA requiring 2L NC. Likely 2/2 pna. No improvement on azithromycin outpatient. s/p IV ceftriaxone and IV doxycycline in ED. PCT 0.47 on admission.  urine legionella neg, strep neg. Sputum cx showing commensal jj, BCx 11/29 and 12/1 NGTD thus far. RVP negative x2. HIV negative. MRSA negative.   - CT Chest non contrast on 11/29 showing: extensive consolidation throughout the lungs bilaterally, most severe in L lung; findings compatible with PNA. Very small L pleural effusion.   - was previously on CTX. s/p RRT on 12/1 for tachypnea, tachycardia, worsening hypoxia, abx were broadened zosyn then. Seen by MICU and no MICU needs.   - ID and pulm following; recs appreciated  - Mycoplasma IgM + -> suspect etiology of the PNA to be mycoplasma   - now on levofloxacin (12/2 - )  - f/u legionella PCR  - c/w pulmonary toilet: chest PT, aerobika q6, incentive spirometer, HTS 3% 4ml bid  - c/w robitussin for cough   - c/w mucinex bid, tessalon perles, and duonebs  - wean O2 as tolerated with goal >92%, OOTBC  - trend fever curve. If febrile again, get blood cultures and repeat CT chest imaging for further evaluation.

## 2024-12-05 NOTE — PROGRESS NOTE ADULT - ASSESSMENT
23 yo F with PMH ovarian cyst presents to ED with 1 week shortness of breath, cough, and fever. Found to have large L base consolidation c/w pneumonia. Pulmonary consulted for further management.     # pneumonia  # acute hypoxemic respiratory failure  # mycoplasma pneumonia   Fever curve improving, O2 requirements improving -- course not unexpected for degree of pneumonia on CT chest.   - legionella antigen negative, f/u legionella pcr   - strep pneumo neg  - mycoplasma igm positive  - sputum cx: normal respiratory jj   - repeat sputum cx negative   - hiv negative  - mrsa negative  - continue abx per ID  - wean O2 as tolerated (on 2-3L NC)  - incentive spirometry, oob to chair as able  - aerobika for airway clearance  - PT    Maria Alejandra Kay MD  Pulmonary and Critical Care Fellow

## 2024-12-05 NOTE — PROGRESS NOTE ADULT - SUBJECTIVE AND OBJECTIVE BOX
Interval Events:  - on 1L NC this am  - mycoplasma igm positive    REVIEW OF SYSTEMS:  Negative except as documented above.      OBJECTIVE:  ICU Vital Signs Last 24 Hrs  T(C): 36.7 (05 Dec 2024 13:00), Max: 37.7 (04 Dec 2024 18:26)  T(F): 98 (05 Dec 2024 13:00), Max: 99.8 (04 Dec 2024 18:26)  HR: 105 (05 Dec 2024 13:00) (94 - 119)  BP: 125/80 (05 Dec 2024 13:00) (115/70 - 141/93)  BP(mean): --  ABP: --  ABP(mean): --  RR: 18 (05 Dec 2024 13:00) (18 - 19)  SpO2: 92% (05 Dec 2024 13:00) (92% - 100%)    O2 Parameters below as of 05 Dec 2024 13:00  Patient On (Oxygen Delivery Method): room air              CAPILLARY BLOOD GLUCOSE          PHYSICAL EXAM:  General: NAD  HEENT:  EOMI, sclera anicteric, moist mucus membranes  Neck: supple  Cardiovascular: RRR  Respiratory: CTAB, dec L base breath sounds  Abdomen: soft, nontender  Extremities: warm and well perfused, no edema, no clubbing  Skin: no rashes  Neurological: no focal deficits    HOSPITAL MEDICATIONS:  MEDICATIONS  (STANDING):  chlorhexidine 2% Cloths 1 Application(s) Topical daily  enoxaparin Injectable 40 milliGRAM(s) SubCutaneous every 24 hours  guaifenesin/dextromethorphan Oral Liquid 5 milliLiter(s) Oral every 4 hours  influenza   Vaccine 0.5 milliLiter(s) IntraMuscular once  levoFLOXacin IVPB      levoFLOXacin IVPB 750 milliGRAM(s) IV Intermittent every 24 hours  lidocaine   4% Patch 1 Patch Transdermal daily  melatonin 3 milliGRAM(s) Oral at bedtime  petrolatum white Ointment 1 Application(s) Topical three times a day    MEDICATIONS  (PRN):  acetaminophen     Tablet .. 975 milliGRAM(s) Oral every 6 hours PRN Temp greater or equal to 38C (100.4F), Mild Pain (1 - 3)  sodium chloride 0.65% Nasal 1 Spray(s) Both Nostrils every 2 hours PRN Nasal Congestion      LABS:                        12.2   7.49  )-----------( 258      ( 05 Dec 2024 07:35 )             37.0     Hgb Trend: 12.2<--, 10.6<--, 11.8<--, 11.5<--, 11.5<--  12-05    133[L]  |  98  |  9   ----------------------------<  95  5.1   |  22  |  0.42[L]    Ca    9.2      05 Dec 2024 07:35  Phos  2.7     12-05  Mg     2.30     12-05      Creatinine Trend: 0.42<--, 0.44<--, 0.47<--, 0.43<--, 0.45<--, 0.46<--    Urinalysis Basic - ( 05 Dec 2024 07:35 )    Color: x / Appearance: x / SG: x / pH: x  Gluc: 95 mg/dL / Ketone: x  / Bili: x / Urobili: x   Blood: x / Protein: x / Nitrite: x   Leuk Esterase: x / RBC: x / WBC x   Sq Epi: x / Non Sq Epi: x / Bacteria: x            MICROBIOLOGY:     Culture - Sputum (collected 04 Dec 2024 17:17)  Source: .Sputum Sputum  Gram Stain (05 Dec 2024 00:10):    No polymorphonuclear leukocytes per low power field    No Squamous epithelial cells per low power field    No organisms seen per oil power field        RADIOLOGY:  [x] Reviewed and interpreted by me

## 2024-12-05 NOTE — PHYSICAL THERAPY INITIAL EVALUATION ADULT - GENERAL OBSERVATIONS, REHAB EVAL
Pt encountered in semi-supine position in NAD, all lines intact, a&ox4, SPO2 92% RA, BP: 101/56 mmHg, and RN Cha aware.

## 2024-12-06 LAB
ALBUMIN SERPL ELPH-MCNC: 3.1 G/DL — LOW (ref 3.3–5)
ALP SERPL-CCNC: 52 U/L — SIGNIFICANT CHANGE UP (ref 40–120)
ALT FLD-CCNC: 103 U/L — HIGH (ref 4–33)
ANION GAP SERPL CALC-SCNC: 14 MMOL/L — SIGNIFICANT CHANGE UP (ref 7–14)
AST SERPL-CCNC: 49 U/L — HIGH (ref 4–32)
BILIRUB DIRECT SERPL-MCNC: <0.2 MG/DL — SIGNIFICANT CHANGE UP (ref 0–0.3)
BILIRUB INDIRECT FLD-MCNC: >0.1 MG/DL — SIGNIFICANT CHANGE UP (ref 0–1)
BILIRUB SERPL-MCNC: 0.3 MG/DL — SIGNIFICANT CHANGE UP (ref 0.2–1.2)
BUN SERPL-MCNC: 9 MG/DL — SIGNIFICANT CHANGE UP (ref 7–23)
CALCIUM SERPL-MCNC: 9.2 MG/DL — SIGNIFICANT CHANGE UP (ref 8.4–10.5)
CHLORIDE SERPL-SCNC: 100 MMOL/L — SIGNIFICANT CHANGE UP (ref 98–107)
CO2 SERPL-SCNC: 21 MMOL/L — LOW (ref 22–31)
CREAT SERPL-MCNC: 0.42 MG/DL — LOW (ref 0.5–1.3)
EGFR: 141 ML/MIN/1.73M2 — SIGNIFICANT CHANGE UP
GLUCOSE SERPL-MCNC: 120 MG/DL — HIGH (ref 70–99)
HCT VFR BLD CALC: 34.6 % — SIGNIFICANT CHANGE UP (ref 34.5–45)
HGB BLD-MCNC: 10.8 G/DL — LOW (ref 11.5–15.5)
MAGNESIUM SERPL-MCNC: 2.3 MG/DL — SIGNIFICANT CHANGE UP (ref 1.6–2.6)
MCHC RBC-ENTMCNC: 26.5 PG — LOW (ref 27–34)
MCHC RBC-ENTMCNC: 31.2 G/DL — LOW (ref 32–36)
MCV RBC AUTO: 84.8 FL — SIGNIFICANT CHANGE UP (ref 80–100)
NRBC # BLD: 0 /100 WBCS — SIGNIFICANT CHANGE UP (ref 0–0)
NRBC # FLD: 0 K/UL — SIGNIFICANT CHANGE UP (ref 0–0)
PHOSPHATE SERPL-MCNC: 3.3 MG/DL — SIGNIFICANT CHANGE UP (ref 2.5–4.5)
PLATELET # BLD AUTO: 282 K/UL — SIGNIFICANT CHANGE UP (ref 150–400)
POTASSIUM SERPL-MCNC: 4.4 MMOL/L — SIGNIFICANT CHANGE UP (ref 3.5–5.3)
POTASSIUM SERPL-SCNC: 4.4 MMOL/L — SIGNIFICANT CHANGE UP (ref 3.5–5.3)
PROT SERPL-MCNC: 7.8 G/DL — SIGNIFICANT CHANGE UP (ref 6–8.3)
RBC # BLD: 4.08 M/UL — SIGNIFICANT CHANGE UP (ref 3.8–5.2)
RBC # FLD: 12.7 % — SIGNIFICANT CHANGE UP (ref 10.3–14.5)
SODIUM SERPL-SCNC: 135 MMOL/L — SIGNIFICANT CHANGE UP (ref 135–145)
WBC # BLD: 7.06 K/UL — SIGNIFICANT CHANGE UP (ref 3.8–10.5)
WBC # FLD AUTO: 7.06 K/UL — SIGNIFICANT CHANGE UP (ref 3.8–10.5)

## 2024-12-06 PROCEDURE — 99232 SBSQ HOSP IP/OBS MODERATE 35: CPT

## 2024-12-06 PROCEDURE — 99233 SBSQ HOSP IP/OBS HIGH 50: CPT | Mod: GC

## 2024-12-06 RX ORDER — FLUTICASONE PROPIONATE 50 MCG
1 SPRAY, SUSPENSION NASAL
Refills: 0 | Status: DISCONTINUED | OUTPATIENT
Start: 2024-12-06 | End: 2024-12-09

## 2024-12-06 RX ORDER — FLUTICASONE PROPIONATE 50 MCG
1 SPRAY, SUSPENSION NASAL ONCE
Refills: 0 | Status: COMPLETED | OUTPATIENT
Start: 2024-12-06 | End: 2024-12-06

## 2024-12-06 RX ADMIN — LIDOCAINE 1 PATCH: 40 CREAM TOPICAL at 11:08

## 2024-12-06 RX ADMIN — PETROLATUM 1 APPLICATION(S): 960 OINTMENT TOPICAL at 21:26

## 2024-12-06 RX ADMIN — Medication 1 SPRAY(S): at 17:46

## 2024-12-06 RX ADMIN — Medication 5 MILLILITER(S): at 05:50

## 2024-12-06 RX ADMIN — Medication 5 MILLILITER(S): at 11:08

## 2024-12-06 RX ADMIN — PETROLATUM 1 APPLICATION(S): 960 OINTMENT TOPICAL at 13:10

## 2024-12-06 RX ADMIN — CHLORHEXIDINE GLUCONATE 1 APPLICATION(S): 1.2 RINSE ORAL at 11:07

## 2024-12-06 RX ADMIN — ENOXAPARIN SODIUM 40 MILLIGRAM(S): 30 INJECTION SUBCUTANEOUS at 21:27

## 2024-12-06 RX ADMIN — Medication 1 SPRAY(S): at 05:51

## 2024-12-06 RX ADMIN — Medication 5 MILLILITER(S): at 01:53

## 2024-12-06 RX ADMIN — LIDOCAINE 1 PATCH: 40 CREAM TOPICAL at 00:00

## 2024-12-06 RX ADMIN — IRON SUCROSE 110 MILLIGRAM(S): 20 INJECTION, SOLUTION INTRAVENOUS at 17:46

## 2024-12-06 RX ADMIN — Medication 5 MILLILITER(S): at 21:25

## 2024-12-06 RX ADMIN — Medication 5 MILLILITER(S): at 13:11

## 2024-12-06 RX ADMIN — ACETAMINOPHEN, DIPHENHYDRAMINE HCL, PHENYLEPHRINE HCL 3 MILLIGRAM(S): 325; 25; 5 TABLET ORAL at 21:26

## 2024-12-06 RX ADMIN — Medication 1 SPRAY(S): at 11:07

## 2024-12-06 RX ADMIN — LIDOCAINE 1 PATCH: 40 CREAM TOPICAL at 19:05

## 2024-12-06 RX ADMIN — PETROLATUM 1 APPLICATION(S): 960 OINTMENT TOPICAL at 05:50

## 2024-12-06 RX ADMIN — Medication 5 MILLILITER(S): at 17:46

## 2024-12-06 NOTE — DIETITIAN INITIAL EVALUATION ADULT - ORAL INTAKE PTA/DIET HISTORY
Patient reports usual body weight 160lbs. Patient has no known food allergies, follows a halal diet.

## 2024-12-06 NOTE — PROGRESS NOTE ADULT - ATTENDING COMMENTS
Clinically improving. No further fevers.  Oxygen down to 1LPM.  Mycoplasma IgM positive.  Continue antibiotics.  Incentive spirometry. Encourage OOB to chair.
24F with pneumonia. Clinically improving. Fever curve downtrending.  Continue to wean O2 - on 2LPM.  Continue antibiotics.  Incentive spirometry. Encourage OOB to chair.
24F with pneumonia. Clinically improving. No effusion on POCUS.  Continue antibiotics.  Wean O2 as tolerated, down to 2LPM.  Incentive spirometry. OOB to chair.
Clinically improving. No further fevers.  Off oxygen at rest. May require oxygen with exertion for now.   Antibiotics per ID.  Incentive spirometry. Encourage OOB to chair.

## 2024-12-06 NOTE — PROGRESS NOTE ADULT - PROBLEM SELECTOR PLAN 1
Pt febrile, tachy, hypoxic to 89% on RA requiring 2L NC. Likely 2/2 pna. No improvement on azithromycin outpatient. s/p IV ceftriaxone and IV doxycycline in ED. PCT 0.47 on admission.  urine legionella neg, strep neg. Sputum cx showing commensal jj, BCx 11/29 and 12/1 NGTD thus far. RVP negative x2. HIV negative. MRSA negative.   - CT Chest non contrast on 11/29 showing: extensive consolidation throughout the lungs bilaterally, most severe in L lung; findings compatible with PNA. Very small L pleural effusion.   - was previously on CTX. s/p RRT on 12/1 for tachypnea, tachycardia, worsening hypoxia, abx were broadened zosyn then. Seen by MICU and no MICU needs.   - ID and pulm following; recs appreciated  - Mycoplasma IgM + -> suspect etiology of the PNA to be mycoplasma   - now on levofloxacin (12/2 - )  - f/u legionella PCR  - c/w pulmonary toilet: chest PT, aerobika q6, incentive spirometer, HTS 3% 4ml bid  - c/w robitussin for cough   - c/w mucinex bid, tessalon perles, and duonebs  - weaned off oxygen, monitor  with goal >92%, OOTBC  - trend fever curve. If febrile again, get blood cultures and repeat CT chest imaging for further evaluation.  - will need home O2 eval prior to discharge

## 2024-12-06 NOTE — PROGRESS NOTE ADULT - SUBJECTIVE AND OBJECTIVE BOX
Interval Events:  - trialed on RA this AM at rest, SpO2 94%  - continues to have productive cough  - reports continued lightheadedness when sitting or standing     REVIEW OF SYSTEMS:  Negative except as documented above.      OBJECTIVE:  ICU Vital Signs Last 24 Hrs  T(C): 36.4 (06 Dec 2024 04:00), Max: 36.8 (05 Dec 2024 17:00)  T(F): 97.6 (06 Dec 2024 04:00), Max: 98.3 (05 Dec 2024 17:00)  HR: 100 (06 Dec 2024 04:00) (95 - 119)  BP: 110/68 (06 Dec 2024 04:00) (108/64 - 130/85)  BP(mean): --  ABP: --  ABP(mean): --  RR: 19 (06 Dec 2024 04:00) (18 - 22)  SpO2: 98% (06 Dec 2024 04:00) (92% - 100%)    O2 Parameters below as of 06 Dec 2024 04:00  Patient On (Oxygen Delivery Method): nasal cannula  O2 Flow (L/min): 2            CAPILLARY BLOOD GLUCOSE          PHYSICAL EXAM:  General: NAD  HEENT:  EOMI, sclera anicteric, moist mucus membranes  Neck: supple  Cardiovascular: RRR  Respiratory: dec L base breath sounds, R clear  Abdomen: soft, nontender  Extremities: warm and well perfused, no edema, no clubbing  Skin: no rashes  Neurological: no focal deficits    HOSPITAL MEDICATIONS:  MEDICATIONS  (STANDING):  chlorhexidine 2% Cloths 1 Application(s) Topical daily  enoxaparin Injectable 40 milliGRAM(s) SubCutaneous every 24 hours  guaifenesin/dextromethorphan Oral Liquid 5 milliLiter(s) Oral every 4 hours  influenza   Vaccine 0.5 milliLiter(s) IntraMuscular once  iron sucrose IVPB 200 milliGRAM(s) IV Intermittent <User Schedule>  levoFLOXacin IVPB 750 milliGRAM(s) IV Intermittent every 24 hours  levoFLOXacin IVPB      lidocaine   4% Patch 1 Patch Transdermal daily  melatonin 3 milliGRAM(s) Oral at bedtime  petrolatum white Ointment 1 Application(s) Topical three times a day    MEDICATIONS  (PRN):  acetaminophen     Tablet .. 975 milliGRAM(s) Oral every 6 hours PRN Temp greater or equal to 38C (100.4F), Mild Pain (1 - 3)  sodium chloride 0.65% Nasal 1 Spray(s) Both Nostrils every 2 hours PRN Nasal Congestion      LABS:                        12.2   7.49  )-----------( 258      ( 05 Dec 2024 07:35 )             37.0     Hgb Trend: 12.2<--, 10.6<--, 11.8<--, 11.5<--, 11.5<--  12-05    133[L]  |  98  |  9   ----------------------------<  95  5.1   |  22  |  0.42[L]    Ca    9.2      05 Dec 2024 07:35  Phos  2.7     12-05  Mg     2.30     12-05    TPro  7.5  /  Alb  3.2[L]  /  TBili  0.3  /  DBili  <0.2  /  AST  81[H]  /  ALT  139[H]  /  AlkPhos  58  12-05    Creatinine Trend: 0.42<--, 0.44<--, 0.47<--, 0.43<--, 0.45<--, 0.46<--    Urinalysis Basic - ( 05 Dec 2024 07:35 )    Color: x / Appearance: x / SG: x / pH: x  Gluc: 95 mg/dL / Ketone: x  / Bili: x / Urobili: x   Blood: x / Protein: x / Nitrite: x   Leuk Esterase: x / RBC: x / WBC x   Sq Epi: x / Non Sq Epi: x / Bacteria: x            MICROBIOLOGY:     Culture - Sputum (collected 04 Dec 2024 17:17)  Source: .Sputum Sputum  Gram Stain (05 Dec 2024 00:10):    No polymorphonuclear leukocytes per low power field    No Squamous epithelial cells per low power field    No organisms seen per oil power field  Preliminary Report (05 Dec 2024 15:55):    Commensal jj consistent with body site        RADIOLOGY:  [x] Reviewed and interpreted by me

## 2024-12-06 NOTE — DIETITIAN INITIAL EVALUATION ADULT - PERTINENT LABORATORY DATA
12-06    135  |  100  |  9   ----------------------------<  120[H]  4.4   |  21[L]  |  0.42[L]    Ca    9.2      06 Dec 2024 07:31  Phos  3.3     12-06  Mg     2.30     12-06    TPro  7.8  /  Alb  3.1[L]  /  TBili  0.3  /  DBili  <0.2  /  AST  49[H]  /  ALT  103[H]  /  AlkPhos  52  12-06

## 2024-12-06 NOTE — PROGRESS NOTE ADULT - ASSESSMENT
25 yo F with PMH ovarian cyst presents to ED with 1 week shortness of breath, cough, and fever. Found to have large L base consolidation c/w pneumonia. Pulmonary consulted for further management.     # pneumonia  # acute hypoxemic respiratory failure  # mycoplasma pneumonia   Fever curve improving, O2 requirements improving -- course not unexpected for degree of pneumonia on CT chest.   - legionella antigen negative, f/u legionella pcr   - strep pneumo neg  - mycoplasma igm positive  - sputum cx: normal respiratory jj   - repeat sputum cx negative   - hiv negative  - mrsa negative  - continue abx per ID  - wean O2 as tolerated (on 2L NC)  - incentive spirometry, oob to chair as able  - aerobika for airway clearance  - PT    Maria Alejandra Kay MD  Pulmonary and Critical Care Fellow

## 2024-12-06 NOTE — DIETITIAN INITIAL EVALUATION ADULT - OTHER INFO
Pt is a 24 F w/ hx ovarian cyst presents to ED complaining of shortness of breath, fever x1 week. Pt admitted for sepsis w/ acute hypoxic respiratory failure 2/2 pna. Hospital course notable worsening hypoxia, tachypnea, with RRT called on 12/1 and broadening of abx. Seen by MICU then and no MICU needs. Pulm and ID following for further recs. Also with transaminitis, RUQ showing hepatic steatosis. Mycoplasma IgM+, per chart.     Patient reports decreased appetite, consuming ~50% of meals during hospital stay. Small frequent meals, adequate po intake encouraged during visit. Patient denies any difficulty chewing or swallowing, any nausea, vomiting, diarrhea, constipation during visit. Reports last bowel movement 12/5. Current weight: 72.6kg/160lbs(11/29, per chart), consistent with the reported usual body weight. Per Gin HARVEY, weight history: 68kg (9/16). Noted patient has weight gain of +4.6kg/+6.8%BW x 2 months. Continue to monitor weight trend. Food preferences obtained during visit. Patient is amendable to receive Orgain supplement for nutrient support.

## 2024-12-06 NOTE — PROGRESS NOTE ADULT - ASSESSMENT
24 F w/ hx ovarian cyst presents to ED complaining of shortness of breath, fever x1 week. Pt admitted for sepsis w/ acute hypoxic respiratory failure 2/2 PNA    Assessment:  #Pneumonia  #Sepsis, fever, tachycardia   #Hypoxia      -Pt febrile in the ED with normal WBC. Labs significant for elevated LFTs  -CT chest showing Extensive consolidation throughout the lungs bilaterally, but most severe in the left lung. Findings are compatible with pneumonia. Very small left pleural effusion.  -RUQ US showing Hepatic steatosis without focal abnormality.  -UA neg, BCX NGTD. Urine legionella neg, Urine strep neg  -likely mycoplasma pneumonia given IgM positive     Recommendation:  -c/w Levaquin 750mg Q24,   -Cont to monitor WBC curve  -procal noted   - pulm on board, no effusion on U/S   - incentive spirometer.   - plan for 7 days of therapy total.         Will sign off, please call with questions.       Linda Reed  Please contact through MS Teams   If no response or past 5 pm/weekend call [phone].

## 2024-12-06 NOTE — DIETITIAN INITIAL EVALUATION ADULT - PERTINENT MEDS FT
MEDICATIONS  (STANDING):  chlorhexidine 2% Cloths 1 Application(s) Topical daily  enoxaparin Injectable 40 milliGRAM(s) SubCutaneous every 24 hours  fluticasone propionate 50 MICROgram(s)/spray Nasal Spray 1 Spray(s) Both Nostrils two times a day  guaifenesin/dextromethorphan Oral Liquid 5 milliLiter(s) Oral every 4 hours  influenza   Vaccine 0.5 milliLiter(s) IntraMuscular once  iron sucrose IVPB 200 milliGRAM(s) IV Intermittent <User Schedule>  levoFLOXacin IVPB 750 milliGRAM(s) IV Intermittent every 24 hours  levoFLOXacin IVPB      lidocaine   4% Patch 1 Patch Transdermal daily  melatonin 3 milliGRAM(s) Oral at bedtime  petrolatum white Ointment 1 Application(s) Topical three times a day    MEDICATIONS  (PRN):  acetaminophen     Tablet .. 975 milliGRAM(s) Oral every 6 hours PRN Temp greater or equal to 38C (100.4F), Mild Pain (1 - 3)  sodium chloride 0.65% Nasal 1 Spray(s) Both Nostrils every 2 hours PRN Nasal Congestion

## 2024-12-06 NOTE — DIETITIAN INITIAL EVALUATION ADULT - ADD RECOMMEND
1. Nutrition department to provide Orgain 11oz 2x/day (440kcal, 32 gm pro) for nutrient support.   2. Monitor weight, labs, po intake and tolerance, bowel movement, skin integrity.

## 2024-12-06 NOTE — PROGRESS NOTE ADULT - SUBJECTIVE AND OBJECTIVE BOX
[facility] Department of Hospital Medicine  Washington Lamas DO  Available on MS Teams  Pager: [phone]    Patient is a 24y old  Female who presents with a chief complaint of pna (06 Dec 2024 09:06)    OVERNIGHT EVENTS: No acute events overnight    SUBJECTIVE: Pt seen and examined at bedside this morning. with mother at bedside, still improving with cough, less in nature. worked with PT but was not able to get out of bed. reporting still some lightheadedness with standing + coughing. weaned to room air today, does restat when exerting even in bed (turning) to low 90s. repeating orthostatics. Intermittent tachycardic on exertion. eating slightly better.     ADDITIONAL REVIEW OF SYSTEMS:    MEDICATIONS  (STANDING):  chlorhexidine 2% Cloths 1 Application(s) Topical daily  enoxaparin Injectable 40 milliGRAM(s) SubCutaneous every 24 hours  fluticasone propionate 50 MICROgram(s)/spray Nasal Spray 1 Spray(s) Both Nostrils two times a day  guaifenesin/dextromethorphan Oral Liquid 5 milliLiter(s) Oral every 4 hours  influenza   Vaccine 0.5 milliLiter(s) IntraMuscular once  iron sucrose IVPB 200 milliGRAM(s) IV Intermittent <User Schedule>  levoFLOXacin IVPB 750 milliGRAM(s) IV Intermittent every 24 hours  levoFLOXacin IVPB      lidocaine   4% Patch 1 Patch Transdermal daily  melatonin 3 milliGRAM(s) Oral at bedtime  petrolatum white Ointment 1 Application(s) Topical three times a day    MEDICATIONS  (PRN):  acetaminophen     Tablet .. 975 milliGRAM(s) Oral every 6 hours PRN Temp greater or equal to 38C (100.4F), Mild Pain (1 - 3)  sodium chloride 0.65% Nasal 1 Spray(s) Both Nostrils every 2 hours PRN Nasal Congestion      CAPILLARY BLOOD GLUCOSE        I&O's Summary    06 Dec 2024 07:01  -  06 Dec 2024 13:12  --------------------------------------------------------  IN: 250 mL / OUT: 0 mL / NET: 250 mL        PHYSICAL EXAM:  Vital Signs Last 24 Hrs  T(C): 36.6 (06 Dec 2024 12:00), Max: 36.8 (05 Dec 2024 17:00)  T(F): 97.9 (06 Dec 2024 12:00), Max: 98.3 (05 Dec 2024 17:00)  HR: 95 (06 Dec 2024 12:00) (95 - 109)  BP: 112/73 (06 Dec 2024 12:00) (108/64 - 130/85)  BP(mean): --  RR: 18 (06 Dec 2024 12:00) (18 - 22)  SpO2: 97% (06 Dec 2024 12:00) (96% - 100%)    Parameters below as of 06 Dec 2024 12:00  Patient On (Oxygen Delivery Method): room air        Physical Exam  CONSTITUTIONAL: NAD, well-developed, resting, talking in full sentences without accessory mm use  HEENT: clear conjunctiva, PEERLA, EOMI, no rhinorrhea, no pharyngeal erythema, no cervical LAD appreciated.   RESPIRATORY: CTA b/l; slightly poor respiratory status.    CARDIOVASCULAR: RRR, S1 and S2 present, no m/r/g  ABDOMEN: soft, NT, ND, bowel sounds present  EXTREMITIES: No LE edema b/l   MUSCULOSKELETAL: no joint swelling, no tenderness to palpation  NEURO: A&Ox3, moving all extremities independently.   Skin: warm and intact      LABS:                        10.8   7.06  )-----------( 282      ( 06 Dec 2024 07:31 )             34.6     12-06    135  |  100  |  9   ----------------------------<  120[H]  4.4   |  21[L]  |  0.42[L]    Ca    9.2      06 Dec 2024 07:31  Phos  3.3     12-06  Mg     2.30     12-06    TPro  7.8  /  Alb  3.1[L]  /  TBili  0.3  /  DBili  <0.2  /  AST  49[H]  /  ALT  103[H]  /  AlkPhos  52  12-06          Urinalysis Basic - ( 06 Dec 2024 07:31 )    Color: x / Appearance: x / SG: x / pH: x  Gluc: 120 mg/dL / Ketone: x  / Bili: x / Urobili: x   Blood: x / Protein: x / Nitrite: x   Leuk Esterase: x / RBC: x / WBC x   Sq Epi: x / Non Sq Epi: x / Bacteria: x        Culture - Sputum (collected 04 Dec 2024 17:17)  Source: .Sputum Sputum  Gram Stain (05 Dec 2024 00:10):    No polymorphonuclear leukocytes per low power field    No Squamous epithelial cells per low power field    No organisms seen per oil power field  Preliminary Report (05 Dec 2024 15:55):    Commensal jj consistent with body site        RADIOLOGY & ADDITIONAL TESTS:    Results Reviewed:   Imaging Personally Reviewed:  Electrocardiogram Personally Reviewed:    COORDINATION OF CARE:  Care Discussed with Consultants/Other Providers [Y/N]:  Prior or Outpatient Records Reviewed [Y/N]:

## 2024-12-06 NOTE — PROGRESS NOTE ADULT - SUBJECTIVE AND OBJECTIVE BOX
24yPatient is a 24y old  Female who presents with a chief complaint of pna (06 Dec 2024 13:11)      Interval history:  Afebrile, cough improving, off supplemental oxygen.       Allergies:   No Known Allergies      Antimicrobials:  levoFLOXacin IVPB 750 milliGRAM(s) IV Intermittent every 24 hours  levoFLOXacin IVPB          REVIEW OF SYSTEMS:  No N/V  No dysuria   No rash.       Vital Signs Last 24 Hrs  T(C): 36.6 (12-06-24 @ 21:00), Max: 37.2 (12-06-24 @ 16:00)  T(F): 97.9 (12-06-24 @ 21:00), Max: 98.9 (12-06-24 @ 16:00)  HR: 100 (12-06-24 @ 21:00) (95 - 105)  BP: 119/76 (12-06-24 @ 21:00) (108/64 - 119/76)  BP(mean): --  RR: 18 (12-06-24 @ 21:00) (18 - 19)  SpO2: 96% (12-06-24 @ 21:00) (95% - 100%)      PHYSICAL EXAM:  Pt in no acute distress, awake, communicative   breathing comfortably on RA   non distended abdomen  no edema LE   no phlebitis                             10.8   7.06  )-----------( 282      ( 06 Dec 2024 07:31 )             34.6   12-06    135  |  100  |  9   ----------------------------<  120[H]  4.4   |  21[L]  |  0.42[L]    Ca    9.2      06 Dec 2024 07:31  Phos  3.3     12-06  Mg     2.30     12-06    TPro  7.8  /  Alb  3.1[L]  /  TBili  0.3  /  DBili  <0.2  /  AST  49[H]  /  ALT  103[H]  /  AlkPhos  52  12-06      LIVER FUNCTIONS - ( 06 Dec 2024 07:31 )  Alb: 3.1 g/dL / Pro: 7.8 g/dL / ALK PHOS: 52 U/L / ALT: 103 U/L / AST: 49 U/L / GGT: x               Culture - Sputum (collected 04 Dec 2024 17:17)  Source: .Sputum Sputum  Gram Stain (05 Dec 2024 00:10):    No polymorphonuclear leukocytes per low power field    No Squamous epithelial cells per low power field    No organisms seen per oil power field  Preliminary Report (05 Dec 2024 15:55):    Commensal jj consistent with body site

## 2024-12-07 LAB
ADD ON TEST-SPECIMEN IN LAB: SIGNIFICANT CHANGE UP
ALBUMIN SERPL ELPH-MCNC: 3.3 G/DL — SIGNIFICANT CHANGE UP (ref 3.3–5)
ALP SERPL-CCNC: 55 U/L — SIGNIFICANT CHANGE UP (ref 40–120)
ALT FLD-CCNC: 83 U/L — HIGH (ref 4–33)
ANION GAP SERPL CALC-SCNC: 12 MMOL/L — SIGNIFICANT CHANGE UP (ref 7–14)
AST SERPL-CCNC: 45 U/L — HIGH (ref 4–32)
BILIRUB DIRECT SERPL-MCNC: <0.2 MG/DL — SIGNIFICANT CHANGE UP (ref 0–0.3)
BILIRUB INDIRECT FLD-MCNC: >0 MG/DL — SIGNIFICANT CHANGE UP (ref 0–1)
BILIRUB SERPL-MCNC: 0.2 MG/DL — SIGNIFICANT CHANGE UP (ref 0.2–1.2)
BUN SERPL-MCNC: 8 MG/DL — SIGNIFICANT CHANGE UP (ref 7–23)
CALCIUM SERPL-MCNC: 9.3 MG/DL — SIGNIFICANT CHANGE UP (ref 8.4–10.5)
CHLORIDE SERPL-SCNC: 100 MMOL/L — SIGNIFICANT CHANGE UP (ref 98–107)
CO2 SERPL-SCNC: 22 MMOL/L — SIGNIFICANT CHANGE UP (ref 22–31)
CREAT SERPL-MCNC: 0.44 MG/DL — LOW (ref 0.5–1.3)
CULTURE RESULTS: SIGNIFICANT CHANGE UP
CULTURE RESULTS: SIGNIFICANT CHANGE UP
EGFR: 139 ML/MIN/1.73M2 — SIGNIFICANT CHANGE UP
GLUCOSE SERPL-MCNC: 119 MG/DL — HIGH (ref 70–99)
GRAM STN FLD: ABNORMAL
HCT VFR BLD CALC: 35.3 % — SIGNIFICANT CHANGE UP (ref 34.5–45)
HGB BLD-MCNC: 11.6 G/DL — SIGNIFICANT CHANGE UP (ref 11.5–15.5)
L PNEUMO DNA SPEC QL NAA+PROBE: SIGNIFICANT CHANGE UP
LEGIONELLA DNA SPEC NAA+PROBE: SIGNIFICANT CHANGE UP
MAGNESIUM SERPL-MCNC: 2.2 MG/DL — SIGNIFICANT CHANGE UP (ref 1.6–2.6)
MCHC RBC-ENTMCNC: 27.2 PG — SIGNIFICANT CHANGE UP (ref 27–34)
MCHC RBC-ENTMCNC: 32.9 G/DL — SIGNIFICANT CHANGE UP (ref 32–36)
MCV RBC AUTO: 82.7 FL — SIGNIFICANT CHANGE UP (ref 80–100)
NRBC # BLD: 0 /100 WBCS — SIGNIFICANT CHANGE UP (ref 0–0)
NRBC # FLD: 0 K/UL — SIGNIFICANT CHANGE UP (ref 0–0)
PHOSPHATE SERPL-MCNC: 3.7 MG/DL — SIGNIFICANT CHANGE UP (ref 2.5–4.5)
PLATELET # BLD AUTO: 317 K/UL — SIGNIFICANT CHANGE UP (ref 150–400)
POTASSIUM SERPL-MCNC: 4.5 MMOL/L — SIGNIFICANT CHANGE UP (ref 3.5–5.3)
POTASSIUM SERPL-SCNC: 4.5 MMOL/L — SIGNIFICANT CHANGE UP (ref 3.5–5.3)
PROT SERPL-MCNC: 7.8 G/DL — SIGNIFICANT CHANGE UP (ref 6–8.3)
RBC # BLD: 4.27 M/UL — SIGNIFICANT CHANGE UP (ref 3.8–5.2)
RBC # FLD: 12.6 % — SIGNIFICANT CHANGE UP (ref 10.3–14.5)
SODIUM SERPL-SCNC: 134 MMOL/L — LOW (ref 135–145)
SPECIMEN SOURCE: SIGNIFICANT CHANGE UP
WBC # BLD: 6.29 K/UL — SIGNIFICANT CHANGE UP (ref 3.8–10.5)
WBC # FLD AUTO: 6.29 K/UL — SIGNIFICANT CHANGE UP (ref 3.8–10.5)

## 2024-12-07 PROCEDURE — 99232 SBSQ HOSP IP/OBS MODERATE 35: CPT

## 2024-12-07 RX ORDER — SIMETHICONE 125 MG
80 CAPSULE ORAL ONCE
Refills: 0 | Status: COMPLETED | OUTPATIENT
Start: 2024-12-07 | End: 2024-12-07

## 2024-12-07 RX ORDER — BENZONATATE 100 MG/1
100 CAPSULE ORAL EVERY 8 HOURS
Refills: 0 | Status: DISCONTINUED | OUTPATIENT
Start: 2024-12-07 | End: 2024-12-09

## 2024-12-07 RX ORDER — MAGNESIUM, ALUMINUM HYDROXIDE 200-225/5
30 SUSPENSION, ORAL (FINAL DOSE FORM) ORAL EVERY 12 HOURS
Refills: 0 | Status: DISCONTINUED | OUTPATIENT
Start: 2024-12-07 | End: 2024-12-09

## 2024-12-07 RX ADMIN — Medication 30 MILLILITER(S): at 22:12

## 2024-12-07 RX ADMIN — LIDOCAINE 1 PATCH: 40 CREAM TOPICAL at 10:24

## 2024-12-07 RX ADMIN — Medication 5 MILLILITER(S): at 10:24

## 2024-12-07 RX ADMIN — Medication 80 MILLIGRAM(S): at 17:21

## 2024-12-07 RX ADMIN — BENZONATATE 100 MILLIGRAM(S): 100 CAPSULE ORAL at 11:15

## 2024-12-07 RX ADMIN — BENZONATATE 100 MILLIGRAM(S): 100 CAPSULE ORAL at 21:43

## 2024-12-07 RX ADMIN — PETROLATUM 1 APPLICATION(S): 960 OINTMENT TOPICAL at 15:07

## 2024-12-07 RX ADMIN — ACETAMINOPHEN, DIPHENHYDRAMINE HCL, PHENYLEPHRINE HCL 3 MILLIGRAM(S): 325; 25; 5 TABLET ORAL at 21:43

## 2024-12-07 RX ADMIN — LIDOCAINE 1 PATCH: 40 CREAM TOPICAL at 22:24

## 2024-12-07 RX ADMIN — Medication 5 MILLILITER(S): at 05:27

## 2024-12-07 RX ADMIN — PETROLATUM 1 APPLICATION(S): 960 OINTMENT TOPICAL at 05:26

## 2024-12-07 RX ADMIN — CHLORHEXIDINE GLUCONATE 1 APPLICATION(S): 1.2 RINSE ORAL at 10:23

## 2024-12-07 RX ADMIN — LIDOCAINE 1 PATCH: 40 CREAM TOPICAL at 19:24

## 2024-12-07 RX ADMIN — PETROLATUM 1 APPLICATION(S): 960 OINTMENT TOPICAL at 21:43

## 2024-12-07 RX ADMIN — Medication 1 SPRAY(S): at 16:36

## 2024-12-07 RX ADMIN — Medication 5 MILLILITER(S): at 01:46

## 2024-12-07 NOTE — PROGRESS NOTE ADULT - ASSESSMENT
Pt is a 24 F w/ hx ovarian cyst presents to ED complaining of shortness of breath, fever x1 week. Pt admitted for sepsis w/ acute hypoxic respiratory failure 2/2 pna. Hospital course notable worsening hypoxia, tachypnea, with RRT called on 12/1 and broadening of abx. Seen by MICU then and no MICU needs. Pulm and ID following for further recs. Also with transaminitis, RUQ showing hepatic steatosis. Mycoplasma IgM +. Weaned to RA.

## 2024-12-07 NOTE — PROGRESS NOTE ADULT - PROBLEM SELECTOR PLAN 1
Pt febrile, tachy, hypoxic to 89% on RA requiring 2L NC. Likely 2/2 pna. No improvement on azithromycin outpatient. s/p IV ceftriaxone and IV doxycycline in ED. PCT 0.47 on admission.  urine legionella neg, strep neg. Sputum cx showing commensal jj, BCx 11/29 and 12/1 NGTD thus far. RVP negative x2. HIV negative. MRSA negative.   - CT Chest non contrast on 11/29 showing: extensive consolidation throughout the lungs bilaterally, most severe in L lung; findings compatible with PNA. Very small L pleural effusion.   - was previously on CTX. s/p RRT on 12/1 for tachypnea, tachycardia, worsening hypoxia, abx were broadened zosyn then. Seen by MICU and no MICU needs.   - ID and pulm following; recs appreciated  - legionella PCR neg  - Mycoplasma IgM + -> suspect etiology of the PNA to be mycoplasma   - now on levofloxacin (12/2 - ). Per ID, plan to complete 7d course. Given severity of pna and sx, favor completion with iv abx inpt.   - c/w pulmonary toilet: chest PT, aerobika q6, incentive spirometer, HTS 3% 4ml bid  - c/w robitussin for cough   - c/w mucinex bid, tessalon perles, and duonebs  - weaned off oxygen, monitor  with goal >92%, OOTBC  - trend fever curve. If febrile again, get blood cultures and repeat CT chest imaging for further evaluation.  - will need home O2 eval prior to discharge

## 2024-12-07 NOTE — PROGRESS NOTE ADULT - SUBJECTIVE AND OBJECTIVE BOX
[facility] Department of Hospital Medicine  Washington Lamas DO  Available on MS Teams  Pager: [phone]    Patient is a 24y old  Female who presents with a chief complaint of pna (06 Dec 2024 18:00)      OVERNIGHT EVENTS: No acute events overnight    SUBJECTIVE: Pt seen and examined at bedside this morning. Improved to RA. still with dizziness/lightheadedness on exertion, slightly improved, expected given degree of pna. appetite improving. cough slightly better, with dry cough at times. no other acute issues/complaints.     ADDITIONAL REVIEW OF SYSTEMS:    MEDICATIONS  (STANDING):  chlorhexidine 2% Cloths 1 Application(s) Topical daily  enoxaparin Injectable 40 milliGRAM(s) SubCutaneous every 24 hours  fluticasone propionate 50 MICROgram(s)/spray Nasal Spray 1 Spray(s) Both Nostrils two times a day  influenza   Vaccine 0.5 milliLiter(s) IntraMuscular once  levoFLOXacin IVPB 750 milliGRAM(s) IV Intermittent every 24 hours  levoFLOXacin IVPB      lidocaine   4% Patch 1 Patch Transdermal daily  melatonin 3 milliGRAM(s) Oral at bedtime  petrolatum white Ointment 1 Application(s) Topical three times a day    MEDICATIONS  (PRN):  acetaminophen     Tablet .. 975 milliGRAM(s) Oral every 6 hours PRN Temp greater or equal to 38C (100.4F), Mild Pain (1 - 3)  benzonatate 100 milliGRAM(s) Oral every 8 hours PRN Cough  sodium chloride 0.65% Nasal 1 Spray(s) Both Nostrils every 2 hours PRN Nasal Congestion      CAPILLARY BLOOD GLUCOSE        I&O's Summary    06 Dec 2024 07:01  -  07 Dec 2024 07:00  --------------------------------------------------------  IN: 750 mL / OUT: 0 mL / NET: 750 mL    07 Dec 2024 07:01  -  07 Dec 2024 14:27  --------------------------------------------------------  IN: 250 mL / OUT: 0 mL / NET: 250 mL        PHYSICAL EXAM:  Vital Signs Last 24 Hrs  T(C): 37.1 (07 Dec 2024 11:46), Max: 37.2 (06 Dec 2024 16:00)  T(F): 98.7 (07 Dec 2024 11:46), Max: 98.9 (06 Dec 2024 16:00)  HR: 110 (07 Dec 2024 11:46) (93 - 110)  BP: 104/68 (07 Dec 2024 11:46) (104/68 - 119/76)  BP(mean): --  RR: 20 (07 Dec 2024 11:46) (18 - 20)  SpO2: 93% (07 Dec 2024 11:46) (93% - 96%)    Parameters below as of 07 Dec 2024 11:46  Patient On (Oxygen Delivery Method): room air      Physical Exam  CONSTITUTIONAL: NAD, well-developed, resting, talking in full sentences without accessory mm use  HEENT: clear conjunctiva, PEERLA, EOMI, no rhinorrhea, no pharyngeal erythema, no cervical LAD appreciated.   RESPIRATORY: CTA b/l; improving respiratory status able to take deeper breaths.   CARDIOVASCULAR: RRR, S1 and S2 present, no m/r/g  ABDOMEN: soft, NT, ND, bowel sounds present  EXTREMITIES: No LE edema b/l   MUSCULOSKELETAL: no joint swelling, no tenderness to palpation  NEURO: A&Ox3, moving all extremities independently.   Skin: warm and intact      LABS:                        11.6   6.29  )-----------( 317      ( 07 Dec 2024 06:42 )             35.3     12-07    134[L]  |  100  |  8   ----------------------------<  119[H]  4.5   |  22  |  0.44[L]    Ca    9.3      07 Dec 2024 06:42  Phos  3.7     12-07  Mg     2.20     12-07    TPro  7.8  /  Alb  3.1[L]  /  TBili  0.3  /  DBili  <0.2  /  AST  49[H]  /  ALT  103[H]  /  AlkPhos  52  12-06          Urinalysis Basic - ( 07 Dec 2024 06:42 )    Color: x / Appearance: x / SG: x / pH: x  Gluc: 119 mg/dL / Ketone: x  / Bili: x / Urobili: x   Blood: x / Protein: x / Nitrite: x   Leuk Esterase: x / RBC: x / WBC x   Sq Epi: x / Non Sq Epi: x / Bacteria: x        Culture - Sputum (collected 04 Dec 2024 17:17)  Source: .Sputum Sputum  Gram Stain (05 Dec 2024 00:10):    No polymorphonuclear leukocytes per low power field    No Squamous epithelial cells per low power field    No organisms seen per oil power field  Preliminary Report (05 Dec 2024 15:55):    Commensal jj consistent with body site        RADIOLOGY & ADDITIONAL TESTS:    Results Reviewed:   Imaging Personally Reviewed:  Electrocardiogram Personally Reviewed:    COORDINATION OF CARE:  Care Discussed with Consultants/Other Providers [Y/N]:  Prior or Outpatient Records Reviewed [Y/N]:

## 2024-12-08 LAB
ANION GAP SERPL CALC-SCNC: 14 MMOL/L — SIGNIFICANT CHANGE UP (ref 7–14)
BUN SERPL-MCNC: 10 MG/DL — SIGNIFICANT CHANGE UP (ref 7–23)
CALCIUM SERPL-MCNC: 9.4 MG/DL — SIGNIFICANT CHANGE UP (ref 8.4–10.5)
CHLORIDE SERPL-SCNC: 98 MMOL/L — SIGNIFICANT CHANGE UP (ref 98–107)
CO2 SERPL-SCNC: 21 MMOL/L — LOW (ref 22–31)
CREAT SERPL-MCNC: 0.49 MG/DL — LOW (ref 0.5–1.3)
EGFR: 136 ML/MIN/1.73M2 — SIGNIFICANT CHANGE UP
GLUCOSE SERPL-MCNC: 118 MG/DL — HIGH (ref 70–99)
HCT VFR BLD CALC: 36 % — SIGNIFICANT CHANGE UP (ref 34.5–45)
HGB BLD-MCNC: 11.3 G/DL — LOW (ref 11.5–15.5)
MAGNESIUM SERPL-MCNC: 2.2 MG/DL — SIGNIFICANT CHANGE UP (ref 1.6–2.6)
MCHC RBC-ENTMCNC: 26.6 PG — LOW (ref 27–34)
MCHC RBC-ENTMCNC: 31.4 G/DL — LOW (ref 32–36)
MCV RBC AUTO: 84.7 FL — SIGNIFICANT CHANGE UP (ref 80–100)
NRBC # BLD: 0 /100 WBCS — SIGNIFICANT CHANGE UP (ref 0–0)
NRBC # FLD: 0 K/UL — SIGNIFICANT CHANGE UP (ref 0–0)
PHOSPHATE SERPL-MCNC: 4 MG/DL — SIGNIFICANT CHANGE UP (ref 2.5–4.5)
PLATELET # BLD AUTO: 351 K/UL — SIGNIFICANT CHANGE UP (ref 150–400)
POTASSIUM SERPL-MCNC: 4.4 MMOL/L — SIGNIFICANT CHANGE UP (ref 3.5–5.3)
POTASSIUM SERPL-SCNC: 4.4 MMOL/L — SIGNIFICANT CHANGE UP (ref 3.5–5.3)
RBC # BLD: 4.25 M/UL — SIGNIFICANT CHANGE UP (ref 3.8–5.2)
RBC # FLD: 13 % — SIGNIFICANT CHANGE UP (ref 10.3–14.5)
SODIUM SERPL-SCNC: 133 MMOL/L — LOW (ref 135–145)
WBC # BLD: 6.78 K/UL — SIGNIFICANT CHANGE UP (ref 3.8–10.5)
WBC # FLD AUTO: 6.78 K/UL — SIGNIFICANT CHANGE UP (ref 3.8–10.5)

## 2024-12-08 PROCEDURE — 99232 SBSQ HOSP IP/OBS MODERATE 35: CPT

## 2024-12-08 RX ORDER — PANTOPRAZOLE SODIUM 40 MG/1
40 TABLET, DELAYED RELEASE ORAL
Refills: 0 | Status: DISCONTINUED | OUTPATIENT
Start: 2024-12-08 | End: 2024-12-09

## 2024-12-08 RX ADMIN — Medication 30 MILLILITER(S): at 15:09

## 2024-12-08 RX ADMIN — PANTOPRAZOLE SODIUM 40 MILLIGRAM(S): 40 TABLET, DELAYED RELEASE ORAL at 17:29

## 2024-12-08 RX ADMIN — Medication 1 SPRAY(S): at 17:12

## 2024-12-08 RX ADMIN — PETROLATUM 1 APPLICATION(S): 960 OINTMENT TOPICAL at 05:54

## 2024-12-08 RX ADMIN — PETROLATUM 1 APPLICATION(S): 960 OINTMENT TOPICAL at 21:18

## 2024-12-08 RX ADMIN — Medication 1 SPRAY(S): at 05:54

## 2024-12-08 RX ADMIN — ACETAMINOPHEN, DIPHENHYDRAMINE HCL, PHENYLEPHRINE HCL 3 MILLIGRAM(S): 325; 25; 5 TABLET ORAL at 21:18

## 2024-12-08 RX ADMIN — LIDOCAINE 1 PATCH: 40 CREAM TOPICAL at 19:07

## 2024-12-08 RX ADMIN — PETROLATUM 1 APPLICATION(S): 960 OINTMENT TOPICAL at 14:07

## 2024-12-08 RX ADMIN — CHLORHEXIDINE GLUCONATE 1 APPLICATION(S): 1.2 RINSE ORAL at 14:07

## 2024-12-08 RX ADMIN — LIDOCAINE 1 PATCH: 40 CREAM TOPICAL at 14:07

## 2024-12-08 NOTE — PROGRESS NOTE ADULT - PROBLEM SELECTOR PLAN 2
- plan as per above    #Intermittent tachycardia  - suspect secondary to infection  - orthostatics negative  - plan as above    #NUNU  - iron studies c/w NUNU. Not presently menstruating.   - s/p IV iron

## 2024-12-08 NOTE — DISCHARGE NOTE PROVIDER - NSDCCPCAREPLAN_GEN_ALL_CORE_FT
PRINCIPAL DISCHARGE DIAGNOSIS  Diagnosis: Mycoplasma pneumonia  Assessment and Plan of Treatment: - you were found to have pneumonia on admission and your blood work revealed it was due to mycoplasma. Lung and infectious specialists saw you in the hospital and were given antibiotics and completed course of levofloxacin for the pneumonia.      SECONDARY DISCHARGE DIAGNOSES  Diagnosis: Sepsis with acute hypoxic respiratory failure  Assessment and Plan of Treatment: - your oxygen levels were low due to infection with mycoplasma pnuemonia. You were given nebulization therapies, antibiotics, and incentive spirometry which improved your clinical status overall. We were able to bring down and get you off your oxygen levels.    Diagnosis: Transaminitis  Assessment and Plan of Treatment: - your liver numbers are elevated. Ultrasound showed hepatic steatosis. they are improving. monitor your diet and recheck blood work with your doctor within 1-2 weeks for closer monitoring    Diagnosis: Iron deficiency anemia  Assessment and Plan of Treatment: - we found your iron counts were low and gave your intravenous iron supplemental. Take iron tablets doing with food. Monitor for constipation.     PRINCIPAL DISCHARGE DIAGNOSIS  Diagnosis: Mycoplasma pneumonia  Assessment and Plan of Treatment: - you were found to have pneumonia on admission and your blood work revealed it was due to mycoplasma. Lung and infectious specialists saw you in the hospital and were given antibiotics and completed course of levofloxacin for the pneumonia. Repeat CT chest in 6-8 weeks.      SECONDARY DISCHARGE DIAGNOSES  Diagnosis: Sepsis with acute hypoxic respiratory failure  Assessment and Plan of Treatment: - your oxygen levels were low due to infection with mycoplasma pnuemonia. You were given nebulization therapies, antibiotics, and incentive spirometry which improved your clinical status overall. We were able to bring down and get you off supplemental oxygen.    Diagnosis: Transaminitis  Assessment and Plan of Treatment: - your liver numbers are elevated. Ultrasound showed hepatic steatosis. they are improving. monitor your diet and recheck blood work with your doctor within 2 months.    Diagnosis: Iron deficiency anemia  Assessment and Plan of Treatment: - we found your iron counts were low and gave your intravenous iron supplemental. Take iron tablets doing with food. Monitor for constipation.

## 2024-12-08 NOTE — PROVIDER CONTACT NOTE (OTHER) - BACKGROUND
Patient admitted for pneumonia
23 yo F, admitted for pneumonia due to infectious organism
25 yo F, admitted for pneumonia due to infectious organism
Patient admitted for pneumonia.
Patient has no pertinent PMHx.
Patient has no pertinent PMHx.
patient came in for PNA. No pertinent past medical history.
Patient has no pertient PMHx.
Patient has no pertinent PMHx.

## 2024-12-08 NOTE — PROVIDER CONTACT NOTE (OTHER) - NAME OF MD/NP/PA/DO NOTIFIED:
Elidia Drake
Fredo Flynn
Marisela Jaimesaud
Fredo Flynn
Fredo Flynn
CROW Rollins)
Epi Minaya
CROW Rollins)
Elidia Drake
Fredo Flynn

## 2024-12-08 NOTE — PROVIDER CONTACT NOTE (OTHER) - SITUATION
Patient feeling feverish, 102.2 oral temperature
Pt temp 100.1 orally. All other VSS.
Pt temp 101.8  /68 RR 20 O2 100%
patient has a temp of 102.6 F and is tachycardic to 114
Patient complaining of back pain, 9/10
Patient was sating 90 on 2L nasal canula. Provider assessed patient at bedside and patient was saying they feel SOB.
patient's HR is 113
Patient has rectal temp of 102.8
Patient rectal temp 102.8
Pt febrile. Oral temp 100.1. Pt A &Ox4, denies chest pain, pain a discomfort at this time.

## 2024-12-08 NOTE — DISCHARGE NOTE PROVIDER - HOSPITAL COURSE
24 F w/ hx ovarian cyst presents to ED complaining of shortness of breath, fever x1 week. Pt admitted for sepsis w/ acute hypoxic respiratory failure 2/2 pna. Hospital course notable worsening hypoxia, tachypnea, with RRT called on 12/1 and broadening of abx. Seen by MICU then and no MICU needs. Pulm and ID following for further recs. Also with transaminitis, RUQ showing hepatic steatosis. Mycoplasma IgM +. Weaned to RA. Completed 7d course of levofloxacin on 12/8.        Problem/Plan - 1:  ·  Problem: Sepsis with acute hypoxic respiratory failure.   ·  Plan: Pt febrile, tachy, hypoxic to 89% on RA requiring 2L NC. Likely 2/2 pna. No improvement on azithromycin outpatient. s/p IV ceftriaxone and IV doxycycline in ED. PCT 0.47 on admission.  urine legionella neg, strep neg. Sputum cx showing commensal jj, BCx 11/29 and 12/1 NGTD thus far. RVP negative x2. HIV negative. MRSA negative.   - CT Chest non contrast on 11/29 showing: extensive consolidation throughout the lungs bilaterally, most severe in L lung; findings compatible with PNA. Very small L pleural effusion.   - was previously on CTX. s/p RRT on 12/1 for tachypnea, tachycardia, worsening hypoxia, abx were broadened zosyn then. Seen by MICU and no MICU needs.   - ID and pulm following; recs appreciated  - legionella PCR neg  - Mycoplasma IgM + -> suspect etiology of the PNA to be mycoplasma   - now on levofloxacin (12/2 - ). Per ID, plan to complete 7d course. Given severity of pna and sx, favor completion with iv abx inpt. Last dose today.   - c/w pulmonary toilet: chest PT, aerobika q6, incentive spirometer, HTS 3% 4ml bid  - c/w robitussin for cough   - c/w mucinex bid, tessalon perles, and duonebs  - weaned off oxygen, monitor  with goal >92%, OOTBC  - will need repeat CT chest in 6-8 weeks to evaluation for resolution of opacities   - trend fever curve. If febrile again, get blood cultures and repeat CT chest imaging for further evaluation.  - will need home O2 eval prior to discharge.     Problem/Plan - 2:  ·  Problem: Sepsis due to pneumonia.   ·  Plan: - plan as per above    #Intermittent tachycardia  - suspect secondary to infection  - orthostatics negative  - plan as above    #NUNU  - iron studies c/w NUNU. Not presently menstruating.   - s/p IV iron.     Problem/Plan - 3:  ·  Problem: Transaminitis.   ·  Plan: mildly elevated on admission, baseline wnl from prior labwork in September  - ast/alt elevated at present  - RUQ US done on hepatic steatosis with normal gallbladder. Hepatic panel notable for non reactive HepB surface antigen, Hep C and Hep B otherwise non reactive.   - improving  - continue to trend and monitor closely    #Mild Hyponatremia - improving.   24 F w/ hx ovarian cyst presents to ED complaining of shortness of breath, fever x1 week. Pt admitted for sepsis w/ acute hypoxic respiratory failure 2/2 pna. Hospital course notable worsening hypoxia, tachypnea, with RRT called on 12/1 and broadening of abx. Seen by MICU then and no MICU needs. Pulm and ID following for further recs. Also with transaminitis, RUQ showing hepatic steatosis. Mycoplasma IgM +. Weaned to RA.     ·  Problem: Acute hypoxic respiratory failure d/t pneumonia   ·  Plan: Pt was hypoxic to 89% on RA requiring 2L NC.   - CT Chest non contrast on 11/29 showing: extensive consolidation throughout the lungs bilaterally, most severe in L lung; findings compatible with PNA. Very small L pleural effusion.  - was previously on CTX. s/p RRT on 12/1 for tachypnea, tachycardia, worsening hypoxia, abx were broadened to zosyn then. Seen by MICU and no MICU needs.   - Mycoplasma IgM + -> suspect etiology of the PNA to be mycoplasma   - now s/p 7 day course of levofloxacin   - c/w pulmonary toilet: chest PT, aerobika q6, incentive spirometer, HTS 3% 4ml bid  - c/w robitussin for cough   - c/w mucinex bid, tessalon perles, and duonebs  - weaned off O2, sating well on RA   - repeat CT chest in 6-8 weeks.     Problem/Plan - 2:  ·  Problem: Sepsis due to pneumonia.   ·  Plan: - sepsis POA d/t Mycoplasma pneumonia, sepsis resolved   - s/p antibiotics as above  - PCT 0.47 on admission.  urine legionella neg, strep neg  - Sputum cx showing commensal jj, BCx 11/29 and 12/1 NGTD   - RVP negative x2. HIV negative. MRSA negative  - Mycoplasma IgM +.     Problem/Plan - 3:  ·  Problem: Transaminitis.   ·  Plan: mildly elevated on admission, baseline wnl from prior labwork in September  - RUQ US showed hepatic steatosis with normal gallbladder. Hepatic panel notable for non reactive HepB surface antigen, Hep C and Hep B otherwise non reactive.   - continue to trend and monitor closely.     Problem/Plan - 4:  ·  Problem: Anemia.   ·  Plan: - iron studies c/w NUNU. Not presently menstruating.   - s/p IV iron  - monitor CBC.

## 2024-12-08 NOTE — PROGRESS NOTE ADULT - SUBJECTIVE AND OBJECTIVE BOX
[facility] Department of Hospital Medicine  Washington Lamas DO  Available on MS Teams  Pager: [phone]    Patient is a 24y old  Female who presents with a chief complaint of pna (07 Dec 2024 14:27)    OVERNIGHT EVENTS: No acute events overnight    SUBJECTIVE: Pt seen and examined at bedside this morning with mother at bedside, reporting some with cough but improving. Yesterday walked around the unit, tachy at times, still felt a bit weak/lightheaded ad well. Tele reviewed. On RA.     ADDITIONAL REVIEW OF SYSTEMS:    MEDICATIONS  (STANDING):  chlorhexidine 2% Cloths 1 Application(s) Topical daily  enoxaparin Injectable 40 milliGRAM(s) SubCutaneous every 24 hours  fluticasone propionate 50 MICROgram(s)/spray Nasal Spray 1 Spray(s) Both Nostrils two times a day  influenza   Vaccine 0.5 milliLiter(s) IntraMuscular once  levoFLOXacin IVPB 750 milliGRAM(s) IV Intermittent every 24 hours  levoFLOXacin IVPB      lidocaine   4% Patch 1 Patch Transdermal daily  melatonin 3 milliGRAM(s) Oral at bedtime  petrolatum white Ointment 1 Application(s) Topical three times a day    MEDICATIONS  (PRN):  acetaminophen     Tablet .. 975 milliGRAM(s) Oral every 6 hours PRN Temp greater or equal to 38C (100.4F), Mild Pain (1 - 3)  aluminum hydroxide/magnesium hydroxide/simethicone Suspension 30 milliLiter(s) Oral every 12 hours PRN Dyspepsia  benzonatate 100 milliGRAM(s) Oral every 8 hours PRN Cough  sodium chloride 0.65% Nasal 1 Spray(s) Both Nostrils every 2 hours PRN Nasal Congestion      CAPILLARY BLOOD GLUCOSE        I&O's Summary    07 Dec 2024 07:01  -  08 Dec 2024 07:00  --------------------------------------------------------  IN: 750 mL / OUT: 0 mL / NET: 750 mL        PHYSICAL EXAM:  Vital Signs Last 24 Hrs  T(C): 36.8 (08 Dec 2024 05:56), Max: 36.8 (08 Dec 2024 05:56)  T(F): 98.3 (08 Dec 2024 05:56), Max: 98.3 (08 Dec 2024 05:56)  HR: 96 (08 Dec 2024 05:56) (96 - 107)  BP: 101/71 (08 Dec 2024 05:56) (101/71 - 111/68)  BP(mean): --  RR: 18 (08 Dec 2024 05:56) (18 - 20)  SpO2: 95% (08 Dec 2024 05:56) (94% - 95%)    Parameters below as of 08 Dec 2024 05:56  Patient On (Oxygen Delivery Method): room air    Physical Exam  CONSTITUTIONAL: NAD, well-developed, resting, talking in full sentences without accessory mm use, RA  HEENT: clear conjunctiva, PEERLA, EOMI, no rhinorrhea, no pharyngeal erythema, no cervical LAD appreciated.   RESPIRATORY: CTA b/l; improving respiratory status able to take deeper breaths.   CARDIOVASCULAR: RRR, S1 and S2 present, no m/r/g  ABDOMEN: soft, NT, ND, bowel sounds present  EXTREMITIES: No LE edema b/l   MUSCULOSKELETAL: no joint swelling, no tenderness to palpation  NEURO: A&Ox3, moving all extremities independently.   Skin: warm and intact      LABS:                        11.3   6.78  )-----------( 351      ( 08 Dec 2024 06:37 )             36.0     12-08    133[L]  |  98  |  10  ----------------------------<  118[H]  4.4   |  21[L]  |  0.49[L]    Ca    9.4      08 Dec 2024 06:37  Phos  4.0     12-08  Mg     2.20     12-08    TPro  7.8  /  Alb  3.3  /  TBili  0.2  /  DBili  <0.2  /  AST  45[H]  /  ALT  83[H]  /  AlkPhos  55  12-07          Urinalysis Basic - ( 08 Dec 2024 06:37 )    Color: x / Appearance: x / SG: x / pH: x  Gluc: 118 mg/dL / Ketone: x  / Bili: x / Urobili: x   Blood: x / Protein: x / Nitrite: x   Leuk Esterase: x / RBC: x / WBC x   Sq Epi: x / Non Sq Epi: x / Bacteria: x          RADIOLOGY & ADDITIONAL TESTS:    Results Reviewed:   Imaging Personally Reviewed:  Electrocardiogram Personally Reviewed:    COORDINATION OF CARE:  Care Discussed with Consultants/Other Providers [Y/N]:  Prior or Outpatient Records Reviewed [Y/N]:

## 2024-12-08 NOTE — PROVIDER CONTACT NOTE (OTHER) - ASSESSMENT
Patient shows no increased work of breathing, chest pain.
Pt febrile. Oral temp 100.1. Pt A &Ox4, denies chest pain, pain a discomfort at this time.
Pt laying in bed, denies pain. Pt receiving O2 nonrebreather and is saturating well.
Patient reports no labored work of breathing, reports feeling feverish. No signs of chest pain.
Patient is A&Ox4 and stable.
Pt laying upright in bed. Pt denies pain. Pt saturating well with NRB in place.
patient asymptomatic
Patient explains that her breathing is fine, vitals are stable other than the oral temp 101.3 and RR being 36. No signs of chest pain indicated.
Patient is A&Ox4 and stable.
Patient reports no labored work of breathing, reports feeling feverish. No signs of chest pain.

## 2024-12-08 NOTE — PROVIDER CONTACT NOTE (OTHER) - ACTION/TREATMENT ORDERED:
ACP made aware. Will continue to monitor.
NANCYP Toradol ordrered.
ACP notified.
Give Tylenol for fever and place ice packs on patient.
Team made aware. PRN tylenol given. Care plan continues.
Tylenol IV administered.
Team made aware. Provider to RN to give PRN tylenol ordered and given. Care plan continues
ACP made aware. IV tylenol administered.
IV Tylenol ordered. STAT EKG ordered.
Put patient on non-rebreather

## 2024-12-08 NOTE — CHART NOTE - NSCHARTNOTEFT_GEN_A_CORE
Chart reviewed. Sputum cx with strep pneumo.    Plan:  - complete course of abx  - weaned to room air   - will need repeat CT chest in 6-8 weeks to evaluation for resolution of opacities     Pulmonary to sign off. Please re-consult prn.       Prior to discharge:  Please email: [email or web address] to setup an appointment prior to discharge. Please email on the day prior to anticipated discharge. The patient will be given a telehealth appointment in 1-2 days following discharge. Include the name, , hospital of discharge, expected date of discharge, diagnosis, and preferred phone number. Please include the date and time of the appointment on the patient's discharge summary and that the appointment is telehealth.    Pulmonary/Sleep Clinic  [address] [place] [address]  [phone]

## 2024-12-08 NOTE — PROGRESS NOTE ADULT - PROBLEM SELECTOR PLAN 1
Pt febrile, tachy, hypoxic to 89% on RA requiring 2L NC. Likely 2/2 pna. No improvement on azithromycin outpatient. s/p IV ceftriaxone and IV doxycycline in ED. PCT 0.47 on admission.  urine legionella neg, strep neg. Sputum cx showing commensal jj, BCx 11/29 and 12/1 NGTD thus far. RVP negative x2. HIV negative. MRSA negative.   - CT Chest non contrast on 11/29 showing: extensive consolidation throughout the lungs bilaterally, most severe in L lung; findings compatible with PNA. Very small L pleural effusion.   - was previously on CTX. s/p RRT on 12/1 for tachypnea, tachycardia, worsening hypoxia, abx were broadened zosyn then. Seen by MICU and no MICU needs.   - ID and pulm following; recs appreciated  - legionella PCR neg  - Mycoplasma IgM + -> suspect etiology of the PNA to be mycoplasma   - now on levofloxacin (12/2 - ). Per ID, plan to complete 7d course. Given severity of pna and sx, favor completion with iv abx inpt. Last dose today.   - c/w pulmonary toilet: chest PT, aerobika q6, incentive spirometer, HTS 3% 4ml bid  - c/w robitussin for cough   - c/w mucinex bid, tessalon perles, and duonebs  - weaned off oxygen, monitor  with goal >92%, OOTBC  - trend fever curve. If febrile again, get blood cultures and repeat CT chest imaging for further evaluation.  - will need home O2 eval prior to discharge

## 2024-12-08 NOTE — DISCHARGE NOTE PROVIDER - NSDCFUADDAPPT_GEN_ALL_CORE_FT
APPTS ARE READY TO BE MADE: [x] YES    Best Family or Patient Contact (if needed):    Additional Information about above appointments (if needed):    1:   2:   3:     Other comments or requests:    APPTS ARE READY TO BE MADE: [x] YES    Best Family or Patient Contact (if needed):    Additional Information about above appointments (if needed):    1:   2:   3:     Other comments or requests:   Appointment was scheduled in [facility] on 12/12 at 1pm with chel cyr via telehealth    pcp-Patient advises they do not want our assistance and prefer to coordinate the non - [facility] appointments on their own. No information was provided to the patient.

## 2024-12-08 NOTE — PROVIDER CONTACT NOTE (OTHER) - REASON
Temp 100.1
Patient has rectal temp of 102.8
Patient rectal temp 102.8
Patient was sating 90 on 2L nasal canula. Provider assessed patient at bedside and patient was saying they feel SOB.
Patient complaining of back pain, 9/10
patient's HR is 113
patient has a temp os 102.6 F and is tachycardic to 114
Pt febrile. Oral temp 100.1
Temp 101.8 orally
Patient feeling feverish, 102.2 oral temperature

## 2024-12-08 NOTE — PROGRESS NOTE ADULT - TIME BILLING
Preparing to see the patient including review of tests and other providers' notes, confirming history with family members, performing medical examination and evaluation, counseling and educating the patient/family/caregiver, ordering medications, tests and procedures, communicating with other health care professionals, documenting clinical information in the EMR, independently interpreting results and communicating results to the patient/family/caregiven, care coordination.

## 2024-12-08 NOTE — PROVIDER CONTACT NOTE (OTHER) - DATE AND TIME:
02-Dec-2024 03:00
02-Dec-2024 20:16
30-Nov-2024 05:25
08-Dec-2024 15:16
29-Nov-2024 11:13
03-Dec-2024 01:55
02-Dec-2024 13:40
02-Dec-2024 20:16
29-Nov-2024 22:00
29-Nov-2024 10:35

## 2024-12-09 ENCOUNTER — TRANSCRIPTION ENCOUNTER (OUTPATIENT)
Age: 24
End: 2024-12-09

## 2024-12-09 VITALS
TEMPERATURE: 98 F | HEART RATE: 109 BPM | OXYGEN SATURATION: 97 % | SYSTOLIC BLOOD PRESSURE: 108 MMHG | RESPIRATION RATE: 19 BRPM | DIASTOLIC BLOOD PRESSURE: 73 MMHG

## 2024-12-09 DIAGNOSIS — D64.9 ANEMIA, UNSPECIFIED: ICD-10-CM

## 2024-12-09 LAB
-  CLINDAMYCIN: SIGNIFICANT CHANGE UP
-  ERYTHROMYCIN: SIGNIFICANT CHANGE UP
-  LEVOFLOXACIN: SIGNIFICANT CHANGE UP
-  TETRACYCLINE: SIGNIFICANT CHANGE UP
-  TRIMETHOPRIM/SULFAMETHOXAZOLE: SIGNIFICANT CHANGE UP
-  VANCOMYCIN: SIGNIFICANT CHANGE UP
ANION GAP SERPL CALC-SCNC: 13 MMOL/L — SIGNIFICANT CHANGE UP (ref 7–14)
BUN SERPL-MCNC: 10 MG/DL — SIGNIFICANT CHANGE UP (ref 7–23)
CALCIUM SERPL-MCNC: 9.6 MG/DL — SIGNIFICANT CHANGE UP (ref 8.4–10.5)
CHLORIDE SERPL-SCNC: 99 MMOL/L — SIGNIFICANT CHANGE UP (ref 98–107)
CO2 SERPL-SCNC: 23 MMOL/L — SIGNIFICANT CHANGE UP (ref 22–31)
CREAT SERPL-MCNC: 0.54 MG/DL — SIGNIFICANT CHANGE UP (ref 0.5–1.3)
CULTURE RESULTS: ABNORMAL
EGFR: 133 ML/MIN/1.73M2 — SIGNIFICANT CHANGE UP
GLUCOSE SERPL-MCNC: 116 MG/DL — HIGH (ref 70–99)
HCT VFR BLD CALC: 39.8 % — SIGNIFICANT CHANGE UP (ref 34.5–45)
HGB BLD-MCNC: 12.7 G/DL — SIGNIFICANT CHANGE UP (ref 11.5–15.5)
MAGNESIUM SERPL-MCNC: 2.3 MG/DL — SIGNIFICANT CHANGE UP (ref 1.6–2.6)
MCHC RBC-ENTMCNC: 26.6 PG — LOW (ref 27–34)
MCHC RBC-ENTMCNC: 31.9 G/DL — LOW (ref 32–36)
MCV RBC AUTO: 83.3 FL — SIGNIFICANT CHANGE UP (ref 80–100)
METHOD TYPE: SIGNIFICANT CHANGE UP
NRBC # BLD: 0 /100 WBCS — SIGNIFICANT CHANGE UP (ref 0–0)
NRBC # FLD: 0 K/UL — SIGNIFICANT CHANGE UP (ref 0–0)
ORGANISM # SPEC MICROSCOPIC CNT: ABNORMAL
PHOSPHATE SERPL-MCNC: 3.6 MG/DL — SIGNIFICANT CHANGE UP (ref 2.5–4.5)
PLATELET # BLD AUTO: 377 K/UL — SIGNIFICANT CHANGE UP (ref 150–400)
POTASSIUM SERPL-MCNC: 4.6 MMOL/L — SIGNIFICANT CHANGE UP (ref 3.5–5.3)
POTASSIUM SERPL-SCNC: 4.6 MMOL/L — SIGNIFICANT CHANGE UP (ref 3.5–5.3)
RBC # BLD: 4.78 M/UL — SIGNIFICANT CHANGE UP (ref 3.8–5.2)
RBC # FLD: 12.9 % — SIGNIFICANT CHANGE UP (ref 10.3–14.5)
SODIUM SERPL-SCNC: 135 MMOL/L — SIGNIFICANT CHANGE UP (ref 135–145)
SPECIMEN SOURCE: SIGNIFICANT CHANGE UP
WBC # BLD: 9.25 K/UL — SIGNIFICANT CHANGE UP (ref 3.8–10.5)
WBC # FLD AUTO: 9.25 K/UL — SIGNIFICANT CHANGE UP (ref 3.8–10.5)

## 2024-12-09 PROCEDURE — 99239 HOSP IP/OBS DSCHRG MGMT >30: CPT

## 2024-12-09 RX ORDER — PANTOPRAZOLE SODIUM 40 MG/1
1 TABLET, DELAYED RELEASE ORAL
Qty: 14 | Refills: 0
Start: 2024-12-09 | End: 2024-12-22

## 2024-12-09 RX ADMIN — LIDOCAINE 1 PATCH: 40 CREAM TOPICAL at 02:07

## 2024-12-09 RX ADMIN — PETROLATUM 1 APPLICATION(S): 960 OINTMENT TOPICAL at 06:00

## 2024-12-09 RX ADMIN — Medication 1 SPRAY(S): at 05:59

## 2024-12-09 RX ADMIN — PANTOPRAZOLE SODIUM 40 MILLIGRAM(S): 40 TABLET, DELAYED RELEASE ORAL at 05:59

## 2024-12-09 NOTE — DISCHARGE NOTE NURSING/CASE MANAGEMENT/SOCIAL WORK - FINANCIAL ASSISTANCE
[facility] provides services at a reduced cost to those who are determined to be eligible through [facility]’s financial assistance program. Information regarding [facility]’s financial assistance program can be found by going to [email or web address] or by calling [phone].

## 2024-12-09 NOTE — PROGRESS NOTE ADULT - PROBLEM SELECTOR PROBLEM 4
Prophylactic measure
Anemia
Prophylactic measure

## 2024-12-09 NOTE — PROGRESS NOTE ADULT - PROBLEM SELECTOR PLAN 2
- sepsis POA d/t Mycoplasma pneumonia, sepsis resolved   - s/p antibiotics as above  - PCT 0.47 on admission.  urine legionella neg, strep neg  - Sputum cx showing commensal jj, BCx 11/29 and 12/1 NGTD   - RVP negative x2. HIV negative. MRSA negative  - Mycoplasma IgM +

## 2024-12-09 NOTE — PROGRESS NOTE ADULT - PROBLEM SELECTOR PLAN 1
Acute hypoxic respiratory failure d/t pneumonia   Pt was hypoxic to 89% on RA requiring 2L NC.   -CT Chest non contrast on 11/29 showing: extensive consolidation throughout the lungs bilaterally, most severe in L lung; findings compatible with PNA. Very small L pleural effusion.   - was previously on CTX. s/p RRT on 12/1 for tachypnea, tachycardia, worsening hypoxia, abx were broadened to zosyn then. Seen by MICU and no MICU needs.   - Mycoplasma IgM + -> suspect etiology of the PNA to be mycoplasma   - now s/p 7 day course of levofloxacin   - c/w pulmonary toilet: chest PT, aerobika q6, incentive spirometer, HTS 3% 4ml bid  - c/w robitussin for cough   - c/w mucinex bid, tessalon perles, and duonebs  - weaned off O2, sating well on RA   - repeat CT chest in 6-8 weeks

## 2024-12-09 NOTE — PROGRESS NOTE ADULT - PROBLEM SELECTOR PLAN 3
mildly elevated on admission, baseline wnl from prior labwork in September  - ast/alt elevated at present  - RUQ US done on hepatic steatosis with normal gallbladder. Hepatic panel notable for non reactive HepB surface antigen, Hep C and Hep B otherwise non reactive.   - continue to trend and monitor closely    #Mild Hyponatremia  - Na 133, improving   - monitor closely
mildly elevated  - continue to trend  - hep panel and RUQ US  - r/o legionella
mildly elevated  - continue to trend  - hepatitis panel   - RUQ US hepatic steatosis  - trend
mildly elevated on admission, baseline wnl from prior labwork in September  - ast/alt elevated at present  - RUQ US done on hepatic steatosis with normal gallbladder. Hepatic panel notable for non reactive HepB surface antigen, Hep C and Hep B otherwise non reactive.   - continue to trend and monitor closely
mildly elevated on admission, baseline wnl from prior labwork in September  - ast/alt elevated at present  - RUQ US done on hepatic steatosis with normal gallbladder. Hepatic panel notable for non reactive HepB surface antigen, Hep C and Hep B otherwise non reactive.   - improving  - continue to trend and monitor closely    #Mild Hyponatremia - resolved
mildly elevated on admission, baseline wnl from prior labwork in September  - ast/alt elevated at present  - RUQ US done on hepatic steatosis with normal gallbladder. Hepatic panel notable for non reactive HepB surface antigen, Hep C and Hep B otherwise non reactive.   - continue to trend and monitor closely
mildly elevated on admission, baseline wnl from prior labwork in September  - ast/alt elevated at present  - RUQ US done on hepatic steatosis with normal gallbladder. Hepatic panel notable for non reactive HepB surface antigen, Hep C and Hep B otherwise non reactive.   - improving  - continue to trend and monitor closely    #Mild Hyponatremia - improving
mildly elevated on admission, baseline wnl from prior labwork in September  - ast/alt elevated at present  - RUQ US done on hepatic steatosis with normal gallbladder. Hepatic panel notable for non reactive HepB surface antigen, Hep C and Hep B otherwise non reactive.   - continue to trend and monitor closely    #Mild Hyponatremia  - Na 134 -> 131  - monitor closely
mildly elevated on admission, baseline wnl from prior labwork in September  - ast/alt elevated at present  - RUQ US done on hepatic steatosis with normal gallbladder. Hepatic panel notable for non reactive HepB surface antigen, Hep C and Hep B otherwise non reactive.   - improving  - continue to trend and monitor closely    #Mild Hyponatremia - resolved
mildly elevated on admission, baseline wnl from prior labwork in September - RUQ US showed hepatic steatosis with normal gallbladder. Hepatic panel notable for non reactive HepB surface antigen, Hep C and Hep B otherwise non reactive.   - continue to trend and monitor closely

## 2024-12-09 NOTE — PROGRESS NOTE ADULT - PROBLEM SELECTOR PROBLEM 2
Sepsis due to pneumonia

## 2024-12-09 NOTE — PROGRESS NOTE ADULT - PROBLEM SELECTOR PLAN 4
DVT prophylaxis: lovenox (as benefits outweigh given overall clinical scenario)   Diet: regular    Dispo: Pending clinical course likely in 24-48 hours. Will need home o2 eval prior to dc.    Updated family at bedside 12/2, 12/5, 12/6, 12/8
DVT prophylaxis: lovenox   Diet: regular    Dispo: Pending clinical course. Will need home o2 eval prior to dc.
DVT prophylaxis: lovenox (as benefits outweigh given overall clinical scenario)   Diet: regular    Dispo: Pending clinical course. Will need home o2 eval prior to dc.    Updated family at bedside 12/2, 12/5, 12/6
DVT prophylaxis: lovenox   Diet: regular    Dispo: Pending clinical course
DVT prophylaxis: pt ambulatory, low risk  Diet: regular
- iron studies c/w NUNU. Not presently menstruating.   - s/p IV iron  - monitor CBC
DVT prophylaxis: pt ambulatory, low risk  Diet: regular
DVT prophylaxis: lovenox (as benefits outweigh given overall clinical scenario)   Diet: regular    Dispo: Pending clinical course. Will need home o2 eval prior to dc.
DVT prophylaxis: lovenox (as benefits outweigh given overall clinical scenario)   Diet: regular    Dispo: Pending clinical course. Will need home o2 eval prior to dc.    Updated family at bedside 12/2, 12/5, 12/6
DVT prophylaxis: lovenox (as benefits outweigh given overall clinical scenario)   Diet: regular    Dispo: Pending clinical course. Will need home o2 eval prior to dc.    Updated family at bedside 12/2, 12/5

## 2024-12-09 NOTE — DISCHARGE NOTE NURSING/CASE MANAGEMENT/SOCIAL WORK - PATIENT PORTAL LINK FT
You can access the FollowMyHealth Patient Portal offered by [facility] by registering at the following website: [email or web address]. By joining [employer]’s FollowMyHealth portal, you will also be able to view your health information using other applications (apps) compatible with our system.

## 2024-12-09 NOTE — PROGRESS NOTE ADULT - SUBJECTIVE AND OBJECTIVE BOX
PROGRESS NOTE:     Patient is a 24y old  Female who presents with a chief complaint of pna (08 Dec 2024 17:53)      SUBJECTIVE / OVERNIGHT EVENTS: No acute events.     ADDITIONAL REVIEW OF SYSTEMS:    MEDICATIONS  (STANDING):  chlorhexidine 2% Cloths 1 Application(s) Topical daily  enoxaparin Injectable 40 milliGRAM(s) SubCutaneous every 24 hours  fluticasone propionate 50 MICROgram(s)/spray Nasal Spray 1 Spray(s) Both Nostrils two times a day  influenza   Vaccine 0.5 milliLiter(s) IntraMuscular once  lidocaine   4% Patch 1 Patch Transdermal daily  melatonin 3 milliGRAM(s) Oral at bedtime  pantoprazole    Tablet 40 milliGRAM(s) Oral before breakfast  petrolatum white Ointment 1 Application(s) Topical three times a day    MEDICATIONS  (PRN):  acetaminophen     Tablet .. 975 milliGRAM(s) Oral every 6 hours PRN Temp greater or equal to 38C (100.4F), Mild Pain (1 - 3)  aluminum hydroxide/magnesium hydroxide/simethicone Suspension 30 milliLiter(s) Oral every 12 hours PRN Dyspepsia  benzonatate 100 milliGRAM(s) Oral every 8 hours PRN Cough  sodium chloride 0.65% Nasal 1 Spray(s) Both Nostrils every 2 hours PRN Nasal Congestion      CAPILLARY BLOOD GLUCOSE        I&O's Summary      PHYSICAL EXAM:  Vital Signs Last 24 Hrs  T(C): 36.3 (09 Dec 2024 05:58), Max: 36.7 (08 Dec 2024 13:00)  T(F): 97.3 (09 Dec 2024 05:58), Max: 98.1 (08 Dec 2024 21:00)  HR: 98 (09 Dec 2024 05:58) (94 - 113)  BP: 107/70 (09 Dec 2024 05:58) (103/72 - 107/70)  BP(mean): --  RR: 17 (09 Dec 2024 09:48) (16 - 18)  SpO2: 96% (09 Dec 2024 09:48) (96% - 97%)    Parameters below as of 09 Dec 2024 09:48  Patient On (Oxygen Delivery Method): room air        CONSTITUTIONAL: NAD, well-developed  RESPIRATORY: Normal respiratory effort; lungs are clear to auscultation bilaterally  CARDIOVASCULAR: Regular rate and rhythm, normal S1 and S2, no murmur/rub/gallop; No lower extremity edema; Peripheral pulses are 2+ bilaterally  ABDOMEN: Nontender to palpation, normoactive bowel sounds, no rebound/guarding; No hepatosplenomegaly  MUSCLOSKELETAL: no clubbing or cyanosis of digits; no joint swelling or tenderness to palpation  PSYCH: A+O to person, place, and time; affect appropriate    LABS:                        12.7   9.25  )-----------( 377      ( 09 Dec 2024 07:30 )             39.8     12-09    135  |  99  |  10  ----------------------------<  116[H]  4.6   |  23  |  0.54    Ca    9.6      09 Dec 2024 07:30  Phos  3.6     12-09  Mg     2.30     12-09            Urinalysis Basic - ( 09 Dec 2024 07:30 )    Color: x / Appearance: x / SG: x / pH: x  Gluc: 116 mg/dL / Ketone: x  / Bili: x / Urobili: x   Blood: x / Protein: x / Nitrite: x   Leuk Esterase: x / RBC: x / WBC x   Sq Epi: x / Non Sq Epi: x / Bacteria: x          RADIOLOGY & ADDITIONAL TESTS:  Results Reviewed:   Imaging Personally Reviewed:  Electrocardiogram Personally Reviewed:    COORDINATION OF CARE:  Care Discussed with Consultants/Other Providers [Y/N]:  Prior or Outpatient Records Reviewed [Y/N]:

## 2024-12-09 NOTE — PROGRESS NOTE ADULT - PROVIDER SPECIALTY LIST ADULT
Hospitalist
Infectious Disease
Pulmonology
Pulmonology
Hospitalist
Infectious Disease
Pulmonology
Infectious Disease
Pulmonology
Hospitalist

## 2024-12-09 NOTE — PROGRESS NOTE ADULT - PROBLEM SELECTOR PLAN 5
DVT prophylaxis: lovenox (as benefits outweigh given overall clinical scenario)   Diet: regular    DC home, d/c time 32 minutes

## 2024-12-09 NOTE — PROGRESS NOTE ADULT - PROBLEM SELECTOR PROBLEM 1
Sepsis with acute hypoxic respiratory failure

## 2024-12-11 LAB
-  CEFTRIAXONE (MENINGITIDIS): SIGNIFICANT CHANGE UP
-  CEFTRIAXONE (NON-MENINGITIDIS): SIGNIFICANT CHANGE UP
-  PENICILLIN (MENINGITIDIS): SIGNIFICANT CHANGE UP
-  PENICILLIN (NON-MENINGITIDIS): SIGNIFICANT CHANGE UP
-  PENICILLIN (ORAL PENICILLIN V): SIGNIFICANT CHANGE UP
ORGANISM # SPEC MICROSCOPIC CNT: ABNORMAL

## 2024-12-12 ENCOUNTER — APPOINTMENT (OUTPATIENT)
Dept: PULMONOLOGY | Facility: CLINIC | Age: 24
End: 2024-12-12
Payer: MEDICAID

## 2024-12-12 DIAGNOSIS — J15.7 PNEUMONIA DUE TO MYCOPLASMA PNEUMONIAE: ICD-10-CM

## 2024-12-12 DIAGNOSIS — R74.01 ELEVATION OF LEVELS OF LIVER TRANSAMINASE LEVELS: ICD-10-CM

## 2024-12-12 DIAGNOSIS — B33.8 OTHER SPECIFIED VIRAL DISEASES: ICD-10-CM

## 2024-12-12 DIAGNOSIS — J96.01 ACUTE RESPIRATORY FAILURE WITH HYPOXIA: ICD-10-CM

## 2024-12-12 DIAGNOSIS — A41.9 SEPSIS, UNSPECIFIED ORGANISM: ICD-10-CM

## 2024-12-12 PROCEDURE — 99496 TRANSJ CARE MGMT HIGH F2F 7D: CPT

## 2024-12-12 PROCEDURE — 99495 TRANSJ CARE MGMT MOD F2F 14D: CPT

## 2024-12-12 PROCEDURE — G2211 COMPLEX E/M VISIT ADD ON: CPT | Mod: NC

## 2024-12-19 ENCOUNTER — APPOINTMENT (OUTPATIENT)
Dept: PULMONOLOGY | Facility: CLINIC | Age: 24
End: 2024-12-19

## 2025-03-20 NOTE — CONSULT NOTE ADULT - NS ATTEST RISK PROBLEM GEN_ALL_CORE FT
Care Transitions Program Weekly Follow-up Call    Current Issues/Problems reviewed on call: Patient's daughter Lena reports that he is doing very well.  They are all very happy that he is recovering.  The patient and Lena do not have any questions or concerns for me today.        The patient is taking all medications as prescribed. The patient did not have questions or concerns regarding the medications prescribed.    Transitional Care Management (TCM) appointment was completed.  TCM appointment plan of care and upcoming appointments were reviewed with patient.  Transportation to upcoming appointments to be provided by  family .    Next Care Transitions follow-up call will be within 1 week.    Plan for next follow-up call: assess recovery  
Pneumonia, hypoxemia

## 2025-05-06 NOTE — ED ADULT NURSE NOTE - SUICIDE SCREENING QUESTION 3
----- Message from Jazmin sent at 5/6/2025  3:44 PM CDT -----  Name of Caller:.Rick Sierra When is the first available appointment?Symptoms:miscarriageBest Call Back Number:.[phone] Information: The patient had a miscarriage and a referral was put in for her to schedule an appointment. Call the patient back regarding the appointment. Thx. EL  
Left message for patient to return call to [phone].      
No

## (undated) DEVICE — BLADE SURGICAL #15 CARBON

## (undated) DEVICE — PREP TRAY DRY SKIN PREP SCRUB

## (undated) DEVICE — DRSG OPSITE 2.5 X 2"

## (undated) DEVICE — SOL IRR POUR NS 0.9% 1000ML

## (undated) DEVICE — TROCAR COVIDIEN VERSAPORT BLADELESS OPTICAL 5MM STANDARD

## (undated) DEVICE — SOL IRR POUR H2O 1000ML

## (undated) DEVICE — INSUFFLATION NDL ETHICON ENDOPATH PNEUMOPARITONEUM 120MM

## (undated) DEVICE — GLV 6.5 PROTEXIS (WHITE)

## (undated) DEVICE — DRAPE GENERAL ENDOSCOPY

## (undated) DEVICE — SYR CONTROL LUER LOK 10CC

## (undated) DEVICE — ELCTR CORD FOOTSWITCH 1PLR LAPSCP 10FT

## (undated) DEVICE — INSUFFLATION NDL COVIDIEN STEP 14G SHORT FOR STEP/VERSASTEP

## (undated) DEVICE — PACK GYN LAPAROSCOPY

## (undated) DEVICE — SUT VICRYL 0 27" CT-1

## (undated) DEVICE — SUT VICRYL PLUS 4-0 18" PS-2 UNDYED

## (undated) DEVICE — VENODYNE/SCD SLEEVE CALF MEDIUM

## (undated) DEVICE — TUBING STRYKEFLOW II SUCTION / IRRIGATOR

## (undated) DEVICE — TRAP SPECIMEN SPUTUM 40CC

## (undated) DEVICE — POSITIONER PINK PAD PIGAZZI SYSTEM

## (undated) DEVICE — TUBING INSUFFLATION LAP FILTER 10FT

## (undated) DEVICE — FOLEY TRAY 16FR 5CC LF UMETER CLOSED

## (undated) DEVICE — LIGASURE BLUNT TIP 37CM

## (undated) DEVICE — LIGASURE MARYLAND 37CM

## (undated) DEVICE — SYR LUER LOK 10CC

## (undated) DEVICE — SOL IRR BAG NS 0.9% 3000ML

## (undated) DEVICE — DRAPE TOWEL BLUE 17" X 24"

## (undated) DEVICE — SUT ETHILON 3-0 18" PS-1

## (undated) DEVICE — NDL HYPO REGULAR BEVEL 25G X 1.5" (BLUE)

## (undated) DEVICE — PREP CHLORAPREP HI-LITE ORANGE 26ML

## (undated) DEVICE — TROCAR COVIDIEN VERSAONE FIXATION CANNULA 5MM

## (undated) DEVICE — PREP DYNA-HEX CHG 4% 4OZ BOTTLE (BACTOSHIELD)

## (undated) DEVICE — ENDOCATCH II 15MM

## (undated) DEVICE — D HELP - CLEARVIEW CLEARIFY SYSTEM

## (undated) DEVICE — DRAPE 1/2 SHEET 40X57"

## (undated) DEVICE — ENDOCATCH 10MM SPECIMEN POUCH

## (undated) DEVICE — WARMING BLANKET UPPER ADULT

## (undated) DEVICE — SUT NYLON 3-0 18" PS-2